# Patient Record
Sex: MALE | Race: WHITE | NOT HISPANIC OR LATINO
[De-identification: names, ages, dates, MRNs, and addresses within clinical notes are randomized per-mention and may not be internally consistent; named-entity substitution may affect disease eponyms.]

---

## 2017-04-07 ENCOUNTER — APPOINTMENT (OUTPATIENT)
Dept: CARDIOLOGY | Facility: CLINIC | Age: 72
End: 2017-04-07

## 2017-04-07 VITALS
BODY MASS INDEX: 39.03 KG/M2 | SYSTOLIC BLOOD PRESSURE: 100 MMHG | TEMPERATURE: 98.1 F | HEIGHT: 73 IN | DIASTOLIC BLOOD PRESSURE: 62 MMHG | OXYGEN SATURATION: 93 % | WEIGHT: 294.5 LBS | HEART RATE: 78 BPM

## 2017-04-07 DIAGNOSIS — I73.9 PERIPHERAL VASCULAR DISEASE, UNSPECIFIED: ICD-10-CM

## 2017-04-07 DIAGNOSIS — I47.2 VENTRICULAR TACHYCARDIA: ICD-10-CM

## 2017-08-21 ENCOUNTER — INPATIENT (INPATIENT)
Facility: HOSPITAL | Age: 72
LOS: 1 days | Discharge: HOME | DRG: 690 | End: 2017-08-23
Attending: INTERNAL MEDICINE | Admitting: INTERNAL MEDICINE
Payer: MEDICARE

## 2017-08-21 VITALS
TEMPERATURE: 99 F | SYSTOLIC BLOOD PRESSURE: 111 MMHG | HEART RATE: 64 BPM | DIASTOLIC BLOOD PRESSURE: 76 MMHG | OXYGEN SATURATION: 95 % | RESPIRATION RATE: 22 BRPM

## 2017-08-21 DIAGNOSIS — N39.0 URINARY TRACT INFECTION, SITE NOT SPECIFIED: ICD-10-CM

## 2017-08-21 DIAGNOSIS — I50.9 HEART FAILURE, UNSPECIFIED: ICD-10-CM

## 2017-08-21 DIAGNOSIS — Z98.890 OTHER SPECIFIED POSTPROCEDURAL STATES: Chronic | ICD-10-CM

## 2017-08-21 DIAGNOSIS — N17.9 ACUTE KIDNEY FAILURE, UNSPECIFIED: ICD-10-CM

## 2017-08-21 DIAGNOSIS — Z29.9 ENCOUNTER FOR PROPHYLACTIC MEASURES, UNSPECIFIED: ICD-10-CM

## 2017-08-21 DIAGNOSIS — R06.02 SHORTNESS OF BREATH: ICD-10-CM

## 2017-08-21 DIAGNOSIS — E11.9 TYPE 2 DIABETES MELLITUS WITHOUT COMPLICATIONS: ICD-10-CM

## 2017-08-21 LAB
ALBUMIN SERPL ELPH-MCNC: 4.1 G/DL — SIGNIFICANT CHANGE UP (ref 3.3–5)
ALP SERPL-CCNC: 78 U/L — SIGNIFICANT CHANGE UP (ref 40–120)
ALT FLD-CCNC: 47 U/L RC — HIGH (ref 10–45)
ANION GAP SERPL CALC-SCNC: 13 MMOL/L — SIGNIFICANT CHANGE UP (ref 5–17)
APPEARANCE UR: ABNORMAL
AST SERPL-CCNC: 36 U/L — SIGNIFICANT CHANGE UP (ref 10–40)
BACTERIA # UR AUTO: ABNORMAL /HPF
BASOPHILS # BLD AUTO: 0.1 K/UL — SIGNIFICANT CHANGE UP (ref 0–0.2)
BASOPHILS NFR BLD AUTO: 0.7 % — SIGNIFICANT CHANGE UP (ref 0–2)
BILIRUB SERPL-MCNC: 0.7 MG/DL — SIGNIFICANT CHANGE UP (ref 0.2–1.2)
BILIRUB UR-MCNC: NEGATIVE — SIGNIFICANT CHANGE UP
BUN SERPL-MCNC: 28 MG/DL — HIGH (ref 7–23)
CALCIUM SERPL-MCNC: 9.8 MG/DL — SIGNIFICANT CHANGE UP (ref 8.4–10.5)
CHLORIDE SERPL-SCNC: 101 MMOL/L — SIGNIFICANT CHANGE UP (ref 96–108)
CK MB BLD-MCNC: 1.3 % — SIGNIFICANT CHANGE UP (ref 0–3.5)
CK MB BLD-MCNC: 1.7 % — SIGNIFICANT CHANGE UP (ref 0–3.5)
CK MB CFR SERPL CALC: 2.2 NG/ML — SIGNIFICANT CHANGE UP (ref 0–6.7)
CK MB CFR SERPL CALC: 2.4 NG/ML — SIGNIFICANT CHANGE UP (ref 0–6.7)
CK SERPL-CCNC: 130 U/L — SIGNIFICANT CHANGE UP (ref 30–200)
CK SERPL-CCNC: 184 U/L — SIGNIFICANT CHANGE UP (ref 30–200)
CO2 SERPL-SCNC: 25 MMOL/L — SIGNIFICANT CHANGE UP (ref 22–31)
COLOR SPEC: YELLOW — SIGNIFICANT CHANGE UP
COMMENT - URINE: SIGNIFICANT CHANGE UP
CREAT SERPL-MCNC: 1.67 MG/DL — HIGH (ref 0.5–1.3)
DIFF PNL FLD: ABNORMAL
EOSINOPHIL # BLD AUTO: 0.1 K/UL — SIGNIFICANT CHANGE UP (ref 0–0.5)
EOSINOPHIL NFR BLD AUTO: 1.5 % — SIGNIFICANT CHANGE UP (ref 0–6)
EPI CELLS # UR: SIGNIFICANT CHANGE UP /HPF
GLUCOSE SERPL-MCNC: 124 MG/DL — HIGH (ref 70–99)
GLUCOSE UR QL: NEGATIVE — SIGNIFICANT CHANGE UP
HCT VFR BLD CALC: 39 % — SIGNIFICANT CHANGE UP (ref 39–50)
HGB BLD-MCNC: 12.7 G/DL — LOW (ref 13–17)
HYALINE CASTS # UR AUTO: ABNORMAL
KETONES UR-MCNC: NEGATIVE — SIGNIFICANT CHANGE UP
LEUKOCYTE ESTERASE UR-ACNC: ABNORMAL
LYMPHOCYTES # BLD AUTO: 2.4 K/UL — SIGNIFICANT CHANGE UP (ref 1–3.3)
LYMPHOCYTES # BLD AUTO: 25.6 % — SIGNIFICANT CHANGE UP (ref 13–44)
MCHC RBC-ENTMCNC: 30.8 PG — SIGNIFICANT CHANGE UP (ref 27–34)
MCHC RBC-ENTMCNC: 32.6 GM/DL — SIGNIFICANT CHANGE UP (ref 32–36)
MCV RBC AUTO: 94.4 FL — SIGNIFICANT CHANGE UP (ref 80–100)
MONOCYTES # BLD AUTO: 0.9 K/UL — SIGNIFICANT CHANGE UP (ref 0–0.9)
MONOCYTES NFR BLD AUTO: 10.1 % — SIGNIFICANT CHANGE UP (ref 2–14)
NEUTROPHILS # BLD AUTO: 5.7 K/UL — SIGNIFICANT CHANGE UP (ref 1.8–7.4)
NEUTROPHILS NFR BLD AUTO: 62.1 % — SIGNIFICANT CHANGE UP (ref 43–77)
NITRITE UR-MCNC: NEGATIVE — SIGNIFICANT CHANGE UP
NT-PROBNP SERPL-SCNC: 2927 PG/ML — HIGH (ref 0–300)
PH UR: 6.5 — SIGNIFICANT CHANGE UP (ref 5–8)
PLATELET # BLD AUTO: 201 K/UL — SIGNIFICANT CHANGE UP (ref 150–400)
POTASSIUM SERPL-MCNC: 4.9 MMOL/L — SIGNIFICANT CHANGE UP (ref 3.5–5.3)
POTASSIUM SERPL-SCNC: 4.9 MMOL/L — SIGNIFICANT CHANGE UP (ref 3.5–5.3)
PROT SERPL-MCNC: 7.2 G/DL — SIGNIFICANT CHANGE UP (ref 6–8.3)
PROT UR-MCNC: 100 MG/DL
RBC # BLD: 4.13 M/UL — LOW (ref 4.2–5.8)
RBC # FLD: 12.5 % — SIGNIFICANT CHANGE UP (ref 10.3–14.5)
RBC CASTS # UR COMP ASSIST: ABNORMAL /HPF (ref 0–2)
SODIUM SERPL-SCNC: 139 MMOL/L — SIGNIFICANT CHANGE UP (ref 135–145)
SP GR SPEC: 1.01 — SIGNIFICANT CHANGE UP (ref 1.01–1.02)
TROPONIN T SERPL-MCNC: <0.01 NG/ML — SIGNIFICANT CHANGE UP (ref 0–0.06)
TROPONIN T SERPL-MCNC: <0.01 NG/ML — SIGNIFICANT CHANGE UP (ref 0–0.06)
UROBILINOGEN FLD QL: NEGATIVE — SIGNIFICANT CHANGE UP
WBC # BLD: 9.2 K/UL — SIGNIFICANT CHANGE UP (ref 3.8–10.5)
WBC # FLD AUTO: 9.2 K/UL — SIGNIFICANT CHANGE UP (ref 3.8–10.5)
WBC UR QL: ABNORMAL /HPF (ref 0–5)

## 2017-08-21 PROCEDURE — 99285 EMERGENCY DEPT VISIT HI MDM: CPT | Mod: 25

## 2017-08-21 PROCEDURE — 99223 1ST HOSP IP/OBS HIGH 75: CPT

## 2017-08-21 PROCEDURE — 71010: CPT | Mod: 26

## 2017-08-21 PROCEDURE — 93010 ELECTROCARDIOGRAM REPORT: CPT

## 2017-08-21 RX ORDER — CARVEDILOL PHOSPHATE 80 MG/1
25 CAPSULE, EXTENDED RELEASE ORAL EVERY 12 HOURS
Qty: 0 | Refills: 0 | Status: DISCONTINUED | OUTPATIENT
Start: 2017-08-21 | End: 2017-08-23

## 2017-08-21 RX ORDER — DEXTROSE 50 % IN WATER 50 %
1 SYRINGE (ML) INTRAVENOUS ONCE
Qty: 0 | Refills: 0 | Status: DISCONTINUED | OUTPATIENT
Start: 2017-08-21 | End: 2017-08-23

## 2017-08-21 RX ORDER — INSULIN LISPRO 100/ML
VIAL (ML) SUBCUTANEOUS
Qty: 0 | Refills: 0 | Status: DISCONTINUED | OUTPATIENT
Start: 2017-08-21 | End: 2017-08-23

## 2017-08-21 RX ORDER — ATORVASTATIN CALCIUM 80 MG/1
20 TABLET, FILM COATED ORAL AT BEDTIME
Qty: 0 | Refills: 0 | Status: DISCONTINUED | OUTPATIENT
Start: 2017-08-21 | End: 2017-08-23

## 2017-08-21 RX ORDER — FINASTERIDE 5 MG/1
0 TABLET, FILM COATED ORAL
Qty: 0 | Refills: 0 | COMMUNITY

## 2017-08-21 RX ORDER — DEXTROSE 50 % IN WATER 50 %
25 SYRINGE (ML) INTRAVENOUS ONCE
Qty: 0 | Refills: 0 | Status: DISCONTINUED | OUTPATIENT
Start: 2017-08-21 | End: 2017-08-23

## 2017-08-21 RX ORDER — INSULIN LISPRO 100/ML
VIAL (ML) SUBCUTANEOUS AT BEDTIME
Qty: 0 | Refills: 0 | Status: DISCONTINUED | OUTPATIENT
Start: 2017-08-21 | End: 2017-08-23

## 2017-08-21 RX ORDER — CEFTRIAXONE 500 MG/1
1 INJECTION, POWDER, FOR SOLUTION INTRAMUSCULAR; INTRAVENOUS EVERY 24 HOURS
Qty: 0 | Refills: 0 | Status: DISCONTINUED | OUTPATIENT
Start: 2017-08-21 | End: 2017-08-23

## 2017-08-21 RX ORDER — FINASTERIDE 5 MG/1
5 TABLET, FILM COATED ORAL DAILY
Qty: 0 | Refills: 0 | Status: DISCONTINUED | OUTPATIENT
Start: 2017-08-21 | End: 2017-08-23

## 2017-08-21 RX ORDER — DEXTROSE 50 % IN WATER 50 %
12.5 SYRINGE (ML) INTRAVENOUS ONCE
Qty: 0 | Refills: 0 | Status: DISCONTINUED | OUTPATIENT
Start: 2017-08-21 | End: 2017-08-23

## 2017-08-21 RX ORDER — SODIUM CHLORIDE 9 MG/ML
1000 INJECTION, SOLUTION INTRAVENOUS
Qty: 0 | Refills: 0 | Status: DISCONTINUED | OUTPATIENT
Start: 2017-08-21 | End: 2017-08-23

## 2017-08-21 RX ORDER — HEPARIN SODIUM 5000 [USP'U]/ML
5000 INJECTION INTRAVENOUS; SUBCUTANEOUS EVERY 8 HOURS
Qty: 0 | Refills: 0 | Status: DISCONTINUED | OUTPATIENT
Start: 2017-08-21 | End: 2017-08-23

## 2017-08-21 RX ORDER — GLUCAGON INJECTION, SOLUTION 0.5 MG/.1ML
1 INJECTION, SOLUTION SUBCUTANEOUS ONCE
Qty: 0 | Refills: 0 | Status: DISCONTINUED | OUTPATIENT
Start: 2017-08-21 | End: 2017-08-23

## 2017-08-21 RX ORDER — CEFTRIAXONE 500 MG/1
1 INJECTION, POWDER, FOR SOLUTION INTRAMUSCULAR; INTRAVENOUS ONCE
Qty: 0 | Refills: 0 | Status: COMPLETED | OUTPATIENT
Start: 2017-08-21 | End: 2017-08-21

## 2017-08-21 RX ORDER — FUROSEMIDE 40 MG
0 TABLET ORAL
Qty: 0 | Refills: 0 | COMMUNITY

## 2017-08-21 RX ORDER — TRIAMCINOLONE 4 MG
0 TABLET ORAL
Qty: 0 | Refills: 0 | COMMUNITY

## 2017-08-21 RX ORDER — METFORMIN HYDROCHLORIDE 850 MG/1
0 TABLET ORAL
Qty: 0 | Refills: 0 | COMMUNITY

## 2017-08-21 RX ORDER — ASPIRIN/CALCIUM CARB/MAGNESIUM 324 MG
81 TABLET ORAL DAILY
Qty: 0 | Refills: 0 | Status: DISCONTINUED | OUTPATIENT
Start: 2017-08-21 | End: 2017-08-23

## 2017-08-21 RX ADMIN — ATORVASTATIN CALCIUM 20 MILLIGRAM(S): 80 TABLET, FILM COATED ORAL at 21:56

## 2017-08-21 RX ADMIN — CEFTRIAXONE 100 GRAM(S): 500 INJECTION, POWDER, FOR SOLUTION INTRAMUSCULAR; INTRAVENOUS at 18:53

## 2017-08-21 RX ADMIN — HEPARIN SODIUM 5000 UNIT(S): 5000 INJECTION INTRAVENOUS; SUBCUTANEOUS at 22:26

## 2017-08-21 NOTE — ED PROVIDER NOTE - NS ED ROS FT
General: no fever, no chills +hot flashes  Ophthalmologic: no change in vision  ENMT: no sore throat  Respiratory and Thorax: no SOB, no cough  Cardiovascular: no CP  Gastrointestinal: no abd pain, N/V/D  Genitourinary: +dysuria, +dribbling, +straining on urination  Musculoskeletal: no joint pain  Neurological: no HA  Psychiatric: no anxiety/depression  Hematology/Lymphatics: no abnormal bleeding  Endocrine: no changes in weight

## 2017-08-21 NOTE — ED PROVIDER NOTE - ATTENDING CONTRIBUTION TO CARE
Attending MD Sampson: I personally have seen and examined this patient.  Resident note reviewed and agree on plan of care and except where noted.  See below for details.     71M with PMH including CABG, HF, DM2, BPH Attending MD Sampson: I personally have seen and examined this patient.  Resident note reviewed and agree on plan of care and except where noted.  See below for details.     71M with PMH including CAD s/p CABG, MIx2, cath 6/2012, HF, DM2, neuropathy, nephrolithiasis, BPH presents to the ED with difficulty urinating.  Reports began on Saturday night when he "couldn't pee a little bit". Reports inability to urinate was accompanied by suprapubic pain and dysuria.  Reports also complaining of SOB on Saturday.  Reports that skipped his Lasix secondary to dysuria.  Reports has to strain to urinate and has dribbling.  Denies hematuria.  Reports has had kidney stones in past that necessitated removal by urology.  Reports also had hot flashes and was sweaty on Saturday. Denies fevers, chills, dizziness, weakness. Denies other abdominal pain, nausea, vomiting, diarrhea, blood in stools. Denies chest pain, palpitations, cough.  Reports occasional EtOH (once monthly), former tobacco, denies drugs.  On exam, NAD, head NCAT, PERRL, FROM at neck, no tenderness to palpation or stepoffs along length of spine, lungs with scattered crackles at bases with good inspiratory effort, +S1S2, no m/r/g, abdomen soft with +BS, +suprapubic tenderness, ND, no CVAT, moving all extremities with 5/5 strength bilateral upper and lower extremities; A/P: 71M with difficulty urinating, will obtain UA, UrCx, Bladder scan, labs, including BNP, enzymes, pain control, CXR, EKG, reassess

## 2017-08-21 NOTE — ED ADULT NURSE REASSESSMENT NOTE - NS ED NURSE REASSESS COMMENT FT1
Ballesteros catheter inserted using sterile technique, draining by gravity, secured with StatLock. Patient tolerated procedure well. Drained 200ml clear yellow urine

## 2017-08-21 NOTE — ED PROVIDER NOTE - CARE PLAN
Principal Discharge DX:	Urinary tract infection  Instructions for follow-up, activity and diet:	-UA positive  -ceftraixone for UTI  -admit to medicine

## 2017-08-21 NOTE — H&P ADULT - NSHPLABSRESULTS_GEN_ALL_CORE
Personally reviewed labs in detail below: notable for JHON    Personally reviewed CXR: no appreciable focal consolidations     Personally reviewed EKG SR 1st AV block, PVCs, LAD

## 2017-08-21 NOTE — H&P ADULT - ASSESSMENT
72 yo man with PMH of CABG, hx of CHF EF 25% in 2012, DMT2, BPH presenting with difficulty urinating since Saturday night with dysuria with UA suggestive of UTI and BMP notable for  JHON.

## 2017-08-21 NOTE — ED ADULT NURSE REASSESSMENT NOTE - NS ED NURSE REASSESS COMMENT FT1
Patient appears to be resting comfortably in stretcher; denies pain, n/v/d, dizziness, chest pain, SOB; a&ox3; safety and comfort measures provided

## 2017-08-21 NOTE — ED ADULT NURSE NOTE - OBJECTIVE STATEMENT
72 yo male A&OX3 presents to the ED with the c/o sob. Pt states that he has been having urinary retention for 3 days. States that he has a cardiac hx, on asa 81mg daily, denies any use of blood thinners. Pt states that he noticed that he gained 3 lbs in 1 week. States that he is sob, worsening when he ambulates. Lungs clear, equal, b/l, no cough, no wheezing. Pt appears comfortable, not in resp distress. Pt states that he is having small amounts of urine, no burning on urination, no urinary frequency, denies any fevers or chills. Abd round, soft, distended, no abd pain noted.

## 2017-08-21 NOTE — H&P ADULT - ATTENDING COMMENTS
Dr. Miki Cooper accepted patient's case from the ED and requested inhouse hospitalist team to complete admission. Patient previously unknown to me and I was assigned to case. Dr. Miki Cooper to assume care in AM. Sign out given to night NP Dr. Miki Cooper accepted patient's case from the ED and requested inhouse hospitalist team to complete admission. Patient previously unknown to me and I was assigned to case. Dr. Miki Cooper to assume care in AM. Sign out given to rosi NP, Rebecca, [id number]

## 2017-08-21 NOTE — ED PROVIDER NOTE - PHYSICAL EXAMINATION
Constitutional: no acute distress  Eyes: clear conjunctiva  ENMT: moist oral mucosa  Respiratory: CTA b/l  Cardiovascular: S1, S2, RR  Gastrointestinal: soft, NT, ND  Genitourinary: +suprapubic tenderness. No CVA tenderness.   Extremities: no LE edema  Neurological: AAO x 3  Skin: warm, dry

## 2017-08-21 NOTE — H&P ADULT - PROBLEM SELECTOR PLAN 2
Patient states that he has baseline SOB, CXR with no appreciable consolidations  Unclear if underlying pulm edema yet appears comparable to last CXR in 2012 (no pulm edema at that time). Concern for possible acute CHF with symptoms of orthopnea.   Monitor on tele  Trend CE  C/W Coreg, Crestor, Aspirin  Strict I/O Patient states that he has baseline SOB. Patient is former smoker, but denies formal diagnosis of asthma or COPD. Patient denies diagnosis of BENITO, OHS.  CXR with no appreciable consolidations to suggest PNA. Unclear if underlying pulm edema yet appears comparable to last CXR in 2012 (no pulm edema at that time). Concern for possible acute CHF with symptoms of orthopnea.   Monitor on tele  Trend CE  C/W Coreg, Crestor, Aspirin  Strict I/O  Patient may benefit from outpatient sleep study  BiPAP prn for symptom relief.  Consider pulm consult in AM

## 2017-08-21 NOTE — ED ADULT NURSE NOTE - CHPI ED SYMPTOMS NEG
no chest pain/no cough/no diaphoresis/no hemoptysis/no chills/no fever/no wheezing/no headache/no body aches/no edema

## 2017-08-21 NOTE — H&P ADULT - PROBLEM SELECTOR PLAN 1
UA suggestive of UTI  F/U Urine Culture  C/W Ceftriaxone pending Urine Cx Speciation and Senstivities

## 2017-08-21 NOTE — ED ADULT NURSE NOTE - PMH
Active smoker    CAD (coronary artery disease)    CHF (congestive heart failure)    H/O hemorrhoids    Hyperlipidemia    MI (myocardial infarction)

## 2017-08-21 NOTE — H&P ADULT - HISTORY OF PRESENT ILLNESS
70 yo man with PMH of CABG, hx of CHF EF 25% in 2012, DMT2, BPH presenting with difficulty urinating since Saturday night with dysuria. Patient states that he has had straining on urination and dribbling. Denies hematuria. Endorses sweats. Denies fevers and chills. Patient has been on Finasteride. Has tried Flomax in the distant past which he states didn't control his symptoms at that time. Patient also states that he has had shortness of breath that has worsened, states that it was very difficult for him to sleep on Saturday. Patient states he needed to sit more upright. Endoreses baseline ankle edema. He admits to skipping Lasix in the past 2 days 2/2 to difficulty urination. Denies CP, palpitations. Endorses baseline cough. 70 yo man with PMH of CABG, hx of CHF EF 25% in 2012, DMT2, BPH presenting with difficulty urinating since Saturday night with dysuria. Patient states that he has had straining on urination and dribbling. Denies hematuria. Endorses sweats. Denies fevers and chills. Denies abdominal pain, flank pain, nausea, emesis. Patient has been on Finasteride. Has tried Flomax in the distant past which he states didn't control his symptoms at that time. Patient also states that he has had shortness of breath that has worsened, states that it was very difficult for him to sleep on Saturday. Patient states he needed to sit more upright. Endores baseline ankle edema. He admits to skipping Lasix in the past 2 days 2/2 to difficulty urination. Denies CP, palpitations. Endorses baseline cough. 72 yo man with PMH of CABG, hx of CHF EF 25% in 2012, DMT2, BPH presenting with difficulty urinating since Saturday night with dysuria. Patient states that he has had straining on urination and dribbling. Denies hematuria. Endorses sweats. Denies fevers and chills. Denies abdominal pain, flank pain, nausea, emesis. Patient has been on Finasteride. Has tried Flomax in the distant past which he states didn't control his symptoms at that time. Denies history of UTI. Patient also states that he has had shortness of breath that has worsened, states that it was very difficult for him to sleep on Saturday. Patient states he needed to sit more upright. Endores baseline ankle edema. He admits to skipping Lasix in the past 2 days 2/2 to difficulty urination. Denies CP, palpitation, wheeze. Endorses baseline cough. Patient states that he used his wife's BiPAP/CPAP overnight and felt improvement of symptoms. He has not been formally diagnosed BENITO, OHS.

## 2017-08-21 NOTE — H&P ADULT - PROBLEM SELECTOR PLAN 3
Last Cr in 2012: notable for Cr 1.21. Currently Cr is 1.61  Primary day team to obtain collateral from PMD with regards to baseline  Check Urine lytes   Check Renal US   Ballesteros placed by ED team, monitor I/O Last Cr in 2012: notable for Cr 1.21. Currently Cr is 1.61  Primary day team to obtain collateral from PMD with regards to baseline  Ballesteros placed by ED team, monitor I/O  Check Urine lytes   Check Renal US   Continue with finasteride  Consider renal and urology consult in AM Last Cr in 2012: notable for Cr 1.21. Currently Cr is 1.61  Primary day team to obtain collateral from PMD with regards to baseline  Ballesteros placed by ED team, monitor I/O  Check Urine lytes   Check Renal US   Continue with finasteride  Hold ARB  Consider renal and urology consult in AM

## 2017-08-21 NOTE — H&P ADULT - RS GEN PE MLT RESP DETAILS PC
no wheezes/airway patent/clear to auscultation bilaterally/respirations non-labored/breath sounds equal/rales/no rhonchi/scant bibasilar rales

## 2017-08-21 NOTE — ED PROVIDER NOTE - OBJECTIVE STATEMENT
71/M with hx CABG, heart failure EF 25% in 2012, BPH, T2DM comes to [facility] ED 71/M with hx CABG, heart failure EF 25% in 2012, BPH, T2DM comes to [facility] ED c/o difficulty urinating that started Saturday night as well as suprapubic pain and dysuria. Patient also c/o SOB and admitted to skipping lasix x 2 days due to dysuria. Patient denies hematuria. He endorses straining on urination and dribbling. He has history of nephrolithiasis which was removed by urology in 2000. Patient denies fever/chills. He states he had "hot flashes" last Saturday.

## 2017-08-22 DIAGNOSIS — I25.10 ATHEROSCLEROTIC HEART DISEASE OF NATIVE CORONARY ARTERY WITHOUT ANGINA PECTORIS: ICD-10-CM

## 2017-08-22 DIAGNOSIS — E11.9 TYPE 2 DIABETES MELLITUS WITHOUT COMPLICATIONS: ICD-10-CM

## 2017-08-22 DIAGNOSIS — I50.22 CHRONIC SYSTOLIC (CONGESTIVE) HEART FAILURE: ICD-10-CM

## 2017-08-22 LAB
ANION GAP SERPL CALC-SCNC: 13 MMOL/L — SIGNIFICANT CHANGE UP (ref 5–17)
BASOPHILS # BLD AUTO: 0.1 K/UL — SIGNIFICANT CHANGE UP (ref 0–0.2)
BASOPHILS NFR BLD AUTO: 0.7 % — SIGNIFICANT CHANGE UP (ref 0–2)
BUN SERPL-MCNC: 27 MG/DL — HIGH (ref 7–23)
CALCIUM SERPL-MCNC: 9.4 MG/DL — SIGNIFICANT CHANGE UP (ref 8.4–10.5)
CHLORIDE SERPL-SCNC: 101 MMOL/L — SIGNIFICANT CHANGE UP (ref 96–108)
CK MB BLD-MCNC: 1.2 % — SIGNIFICANT CHANGE UP (ref 0–3.5)
CK MB CFR SERPL CALC: 1.8 NG/ML — SIGNIFICANT CHANGE UP (ref 0–6.7)
CK SERPL-CCNC: 154 U/L — SIGNIFICANT CHANGE UP (ref 30–200)
CO2 SERPL-SCNC: 24 MMOL/L — SIGNIFICANT CHANGE UP (ref 22–31)
CREAT ?TM UR-MCNC: 182 MG/DL — SIGNIFICANT CHANGE UP
CREAT SERPL-MCNC: 1.57 MG/DL — HIGH (ref 0.5–1.3)
EOSINOPHIL # BLD AUTO: 0.2 K/UL — SIGNIFICANT CHANGE UP (ref 0–0.5)
EOSINOPHIL NFR BLD AUTO: 1.4 % — SIGNIFICANT CHANGE UP (ref 0–6)
GLUCOSE SERPL-MCNC: 158 MG/DL — HIGH (ref 70–99)
HBA1C BLD-MCNC: 7.8 % — HIGH (ref 4–5.6)
HCT VFR BLD CALC: 37.1 % — LOW (ref 39–50)
HGB BLD-MCNC: 12.5 G/DL — LOW (ref 13–17)
LYMPHOCYTES # BLD AUTO: 1.8 K/UL — SIGNIFICANT CHANGE UP (ref 1–3.3)
LYMPHOCYTES # BLD AUTO: 17 % — SIGNIFICANT CHANGE UP (ref 13–44)
MAGNESIUM SERPL-MCNC: 2 MG/DL — SIGNIFICANT CHANGE UP (ref 1.6–2.6)
MCHC RBC-ENTMCNC: 32.4 PG — SIGNIFICANT CHANGE UP (ref 27–34)
MCHC RBC-ENTMCNC: 33.8 GM/DL — SIGNIFICANT CHANGE UP (ref 32–36)
MCV RBC AUTO: 95.8 FL — SIGNIFICANT CHANGE UP (ref 80–100)
MONOCYTES # BLD AUTO: 1 K/UL — HIGH (ref 0–0.9)
MONOCYTES NFR BLD AUTO: 9.4 % — SIGNIFICANT CHANGE UP (ref 2–14)
NEUTROPHILS # BLD AUTO: 7.6 K/UL — HIGH (ref 1.8–7.4)
NEUTROPHILS NFR BLD AUTO: 71.4 % — SIGNIFICANT CHANGE UP (ref 43–77)
PHOSPHATE SERPL-MCNC: 3.9 MG/DL — SIGNIFICANT CHANGE UP (ref 2.5–4.5)
PLATELET # BLD AUTO: 205 K/UL — SIGNIFICANT CHANGE UP (ref 150–400)
POTASSIUM SERPL-MCNC: 5 MMOL/L — SIGNIFICANT CHANGE UP (ref 3.5–5.3)
POTASSIUM SERPL-SCNC: 5 MMOL/L — SIGNIFICANT CHANGE UP (ref 3.5–5.3)
RBC # BLD: 3.87 M/UL — LOW (ref 4.2–5.8)
RBC # FLD: 12.5 % — SIGNIFICANT CHANGE UP (ref 10.3–14.5)
SODIUM SERPL-SCNC: 138 MMOL/L — SIGNIFICANT CHANGE UP (ref 135–145)
SODIUM UR-SCNC: 46 MMOL/L — SIGNIFICANT CHANGE UP
TROPONIN T SERPL-MCNC: <0.01 NG/ML — SIGNIFICANT CHANGE UP (ref 0–0.06)
UUN UR-MCNC: 1179 MG/DL — SIGNIFICANT CHANGE UP
WBC # BLD: 10.7 K/UL — HIGH (ref 3.8–10.5)
WBC # FLD AUTO: 10.7 K/UL — HIGH (ref 3.8–10.5)

## 2017-08-22 PROCEDURE — 76770 US EXAM ABDO BACK WALL COMP: CPT | Mod: 26

## 2017-08-22 PROCEDURE — 99223 1ST HOSP IP/OBS HIGH 75: CPT

## 2017-08-22 RX ORDER — HYDROCORTISONE 1 %
1 OINTMENT (GRAM) TOPICAL DAILY
Qty: 0 | Refills: 0 | Status: DISCONTINUED | OUTPATIENT
Start: 2017-08-22 | End: 2017-08-23

## 2017-08-22 RX ORDER — FUROSEMIDE 40 MG
40 TABLET ORAL DAILY
Qty: 0 | Refills: 0 | Status: DISCONTINUED | OUTPATIENT
Start: 2017-08-22 | End: 2017-08-23

## 2017-08-22 RX ORDER — TAMSULOSIN HYDROCHLORIDE 0.4 MG/1
0.4 CAPSULE ORAL AT BEDTIME
Qty: 0 | Refills: 0 | Status: DISCONTINUED | OUTPATIENT
Start: 2017-08-22 | End: 2017-08-23

## 2017-08-22 RX ADMIN — TAMSULOSIN HYDROCHLORIDE 0.4 MILLIGRAM(S): 0.4 CAPSULE ORAL at 22:25

## 2017-08-22 RX ADMIN — CEFTRIAXONE 100 GRAM(S): 500 INJECTION, POWDER, FOR SOLUTION INTRAMUSCULAR; INTRAVENOUS at 17:42

## 2017-08-22 RX ADMIN — ATORVASTATIN CALCIUM 20 MILLIGRAM(S): 80 TABLET, FILM COATED ORAL at 22:25

## 2017-08-22 RX ADMIN — CARVEDILOL PHOSPHATE 25 MILLIGRAM(S): 80 CAPSULE, EXTENDED RELEASE ORAL at 17:43

## 2017-08-22 RX ADMIN — Medication 81 MILLIGRAM(S): at 12:20

## 2017-08-22 RX ADMIN — HEPARIN SODIUM 5000 UNIT(S): 5000 INJECTION INTRAVENOUS; SUBCUTANEOUS at 05:11

## 2017-08-22 RX ADMIN — Medication 40 MILLIGRAM(S): at 16:43

## 2017-08-22 RX ADMIN — HEPARIN SODIUM 5000 UNIT(S): 5000 INJECTION INTRAVENOUS; SUBCUTANEOUS at 14:16

## 2017-08-22 RX ADMIN — FINASTERIDE 5 MILLIGRAM(S): 5 TABLET, FILM COATED ORAL at 12:20

## 2017-08-22 RX ADMIN — CARVEDILOL PHOSPHATE 25 MILLIGRAM(S): 80 CAPSULE, EXTENDED RELEASE ORAL at 05:11

## 2017-08-22 RX ADMIN — HEPARIN SODIUM 5000 UNIT(S): 5000 INJECTION INTRAVENOUS; SUBCUTANEOUS at 22:25

## 2017-08-22 NOTE — PROGRESS NOTE ADULT - ASSESSMENT
PT WITH CAD, CHRONIC  SYSTOLIC  CHF, CARDIOMYOPATHY,  DN, OBESITY, BPH, UNABLE  TO  VOID,  BHATIA PLACED    NEEDS IV  LASIX TODAY    UNSURE OF  BASELINE  CREATININE    UROLOGY CALLED, RENAL  U/S,  DVT  PPX,  FOLLOW  URINE  C/S, ON ROCEPHIN  START  FLOMAX  ON ASA/  LIPITOR/ COREG   CARD DR PETE BLANC  CALLED

## 2017-08-22 NOTE — CONSULT NOTE ADULT - ASSESSMENT
70 yo man with LV dysfunction and CAD, admitted with SOB as part of severe urinary retention. Symptoms of SOB resolved with resolution of non-urinating. No CP. No ECG acute changes. No positive cardiac enzymes.

## 2017-08-22 NOTE — CONSULT NOTE ADULT - SUBJECTIVE AND OBJECTIVE BOX
70 yo man with PMH of CABG, hx of CHF EF 25% in 2012, DMT2, BPH admitted for SOB and CHF exacerbation presenting with difficulty urinating since Saturday night with dysuria. Patient states that he has had straining on urination and dribbling. Denies hematuria. fevers and chills, abdominal pain, flank pain, nausea, emesis. Patient has been on Finasteride for a few years, rx'd by his PMD. Has tried Flomax in the distant past which he states he didnt like how he felt on it, but does not recall any serious reaction.  Denies history of recurrent UTI. His wife states he had a hip xray not too long ago which also showed a bladder stone.  He does not have a urologist    PAST MEDICAL & SURGICAL HISTORY:  H/O hemorrhoids  CHF (congestive heart failure)  Hyperlipidemia  MI (myocardial infarction)  CAD (coronary artery disease)  Active smoker  History of open heart surgery  Hx of tonsillectomy  Renal stone: surgically removed    MEDICATIONS  (STANDING):  finasteride 5 milliGRAM(s) Oral daily  aspirin enteric coated 81 milliGRAM(s) Oral daily  atorvastatin 20 milliGRAM(s) Oral at bedtime  carvedilol 25 milliGRAM(s) Oral every 12 hours  insulin lispro (HumaLOG) corrective regimen sliding scale   SubCutaneous three times a day before meals  insulin lispro (HumaLOG) corrective regimen sliding scale   SubCutaneous at bedtime  dextrose 5%. 1000 milliLiter(s) (50 mL/Hr) IV Continuous <Continuous>  dextrose 50% Injectable 12.5 Gram(s) IV Push once  dextrose 50% Injectable 25 Gram(s) IV Push once  dextrose 50% Injectable 25 Gram(s) IV Push once  cefTRIAXone   IVPB 1 Gram(s) IV Intermittent every 24 hours  heparin  Injectable 5000 Unit(s) SubCutaneous every 8 hours  tamsulosin 0.4 milliGRAM(s) Oral at bedtime  furosemide   Injectable 40 milliGRAM(s) IV Push daily  hydrocortisone hemorrhoidal Suppository 1 Suppository(s) Rectal daily    MEDICATIONS  (PRN):  dextrose Gel 1 Dose(s) Oral once PRN Blood Glucose LESS THAN 70 milliGRAM(s)/deciliter  glucagon  Injectable 1 milliGRAM(s) IntraMuscular once PRN Glucose LESS THAN 70 milligrams/deciliter  aluminum hydroxide/magnesium hydroxide/simethicone Suspension 30 milliLiter(s) Oral every 6 hours PRN Dyspepsia    FAMILY HISTORY:  No pertinent family history in first degree relatives    Allergies    No Known Allergies      SOCIAL HISTORY:   Tobacco hx: former smoker    REVIEW OF SYSTEMS: Pertinent positives and negatives as stated in HPI, otherwise negative    Vital signs  T(C): 36.8 (17 @ 04:59), Max: 37.4 (17 @ 19:38)  HR: 64 (17 @ 04:59)  BP: 126/60 (17 @ 04:59)  SpO2: 95% (17 @ 04:59)  Wt(kg): --    Output  I&O's Summary    21 Aug 2017 07:  -  22 Aug 2017 07:00  --------------------------------------------------------  IN: 440 mL / OUT: 750 mL / NET: -310 mL    22 Aug 2017 07:  -  22 Aug 2017 15:24  --------------------------------------------------------  IN: 540 mL / OUT: 0 mL / NET: 540 mL      UOP    Physical Exam  Gen: NAD  Pulm: No respiratory distress, no subcostal retractions  CV: RRR, no JVD  Abd: Soft, NT, ND  :  No discharge or blood at urethral meatus.  Testes descended bilaterally.  Testes and epididymis nontender bilaterally.  Cremasteric reflex present bilaterally. Ballesteros in place: urine clear yellow, prostate smooth nontender 3+  MSK: No edema present    LABS:           @ 05:29    WBC 10.7  / Hct 37.1  / SCr 1.57      @ 15:15    WBC 9.2   / Hct 39.0  / SCr 1.67         138  |  101  |  27<H>  ----------------------------<  158<H>  5.0   |  24  |  1.57<H>    Ca    9.4      22 Aug 2017 05:29  Phos  3.9     -  Mg     2.0         TPro  7.2  /  Alb  4.1  /  TBili  0.7  /  DBili  x   /  AST  36  /  ALT  47<H>  /  AlkPhos  78  -      Urinalysis Basic - ( 21 Aug 2017 15:15 )    Color: Yellow / Appearance: x / S.010 / pH: x  Gluc: x / Ketone: Negative  / Bili: Negative / Urobili: Negative   Blood: x / Protein: 100 mg/dL / Nitrite: Negative   Leuk Esterase: Moderate / RBC: 5-10 /HPF / WBC 10-25 /HPF   Sq Epi: x / Non Sq Epi: x / Bacteria: Few /HPF

## 2017-08-22 NOTE — CONSULT NOTE ADULT - ASSESSMENT
BPH, LUTS  - start Flomax  - continue finasteride  - please obtain urine culture, patient has symptoms and a +UA  - f/u renal bladder sono  - consider TOV at discharge BPH, LUTS  - start Flomax  - continue finasteride  - please obtain urine culture, patient has symptoms and a +UA  - f/u renal bladder sono  - keep garay for 1 wk, ToV in office  - outpt workup for BPH and possible bladder stone  - d/w Dr. Ac

## 2017-08-22 NOTE — CONSULT NOTE ADULT - SUBJECTIVE AND OBJECTIVE BOX
Patient seen and evaluated @ 1845.  Chief Complaint: Patient is a 71y old  Male who presents with a chief complaint of difficulty urinating (21 Aug 2017 20:42)      HPI:  70 yo man with PMH of CABG, hx of CHF EF 25% in , DMT2, BPH presenting with difficulty urinating since Saturday night with dysuria. Patient states that he has had straining on urination and dribbling. Denies hematuria. Endorses sweats. Denies fevers and chills. Denies abdominal pain, flank pain, nausea, emesis. Patient has been on Finasteride. Has tried Flomax in the distant past which he states didn't control his symptoms at that time. Denies history of UTI. Patient also states that he has had shortness of breath that has worsened, states that it was very difficult for him to sleep on Saturday. Patient states he needed to sit more upright. Endores baseline ankle edema. He admits to skipping Lasix in the past 2 days 2/2 to difficulty urination. Denies CP, palpitation, wheeze. Endorses baseline cough. Patient states that he used his wife's BiPAP/CPAP overnight and felt improvement of symptoms. He has not been formally diagnosed BENITO, OHS. (21 Aug 2017 20:42)    Cardiac history: I have known patient since . Cardiomyopathy with CAD, treated with mammary to his LAD. RCA and circumflex so small and occluded, no bypass could be done.  Nonsustained VT, but not inducible at EP study. Has refused AICD since then. Did not do well on digoxin, and has refused Entresto so far.  Most recently seen in April of this year. Most recent echo in May of 2016 with LVEF 28%, mild to moderate MR. Normal RV size and function with RVSP 33 mm mercury.    PMH:   H/O hemorrhoids  CHF (congestive heart failure)  Hyperlipidemia  MI (myocardial infarction)  CAD (coronary artery disease)  Active smoker    PSH:   History of open heart surgery  Hx of tonsillectomy  Renal stone    Medications:   finasteride 5 milliGRAM(s) Oral daily  aspirin enteric coated 81 milliGRAM(s) Oral daily  atorvastatin 20 milliGRAM(s) Oral at bedtime  carvedilol 25 milliGRAM(s) Oral every 12 hours  insulin lispro (HumaLOG) corrective regimen sliding scale   SubCutaneous three times a day before meals  insulin lispro (HumaLOG) corrective regimen sliding scale   SubCutaneous at bedtime  dextrose 5%. 1000 milliLiter(s) IV Continuous <Continuous>  dextrose Gel 1 Dose(s) Oral once PRN  dextrose 50% Injectable 12.5 Gram(s) IV Push once  dextrose 50% Injectable 25 Gram(s) IV Push once  dextrose 50% Injectable 25 Gram(s) IV Push once  glucagon  Injectable 1 milliGRAM(s) IntraMuscular once PRN  cefTRIAXone   IVPB 1 Gram(s) IV Intermittent every 24 hours  heparin  Injectable 5000 Unit(s) SubCutaneous every 8 hours  tamsulosin 0.4 milliGRAM(s) Oral at bedtime  furosemide   Injectable 40 milliGRAM(s) IV Push daily  hydrocortisone hemorrhoidal Suppository 1 Suppository(s) Rectal daily  aluminum hydroxide/magnesium hydroxide/simethicone Suspension 30 milliLiter(s) Oral every 6 hours PRN    Allergies:  No Known Allergies    FAMILY HISTORY:  No pertinent family history in first degree relatives    Social History:  Smoking: Current  Alcohol: None now.  Drugs:    Review of Systems:  Constitutional: [ -] Fever [- ] Chills [ ] Fatigue [ ] Weight change   HEENT: [ ] Blurred vision [ ] Eye Pain [ ] Headache [ ] Runny nose [ ] Sore Throat   Respiratory: [ ] Cough [ ] Wheezing [- ] Shortness of breath  Cardiovascular: [ -] Chest Pain [ -] Palpitations [ ] ENRIQUEZ [ ] PND [ ] Orthopnea  Gastrointestinal: [ -] Abdominal Pain [ ] Diarrhea [ ] Constipation [ ] Hemorrhoids [ ] Nausea [ ] Vomiting  Genitourinary: [+ ] Nocturia [- ] Dysuria [ -] Incontinence  Extremities: [ -] Swelling [ ] Joint Pain  Neurologic: [- ] Focal deficit [ ] Paresthesias [ ] Syncope  Lymphatic: [ ] Swelling [ ] Lymphadenopathy   Skin: [ ] Rash [ ] Ecchymoses [ ] Wounds [ ] Lesions  Psychiatry: [ ] Depression [ ] Suicidal/Homicidal Ideation [ ] Anxiety [ ] Sleep Disturbances  [ -] 10 point review of systems is otherwise negative except as mentioned above            [ ]Unable to obtain    Physical Exam:  T(C): 36.8 (17 @ 04:59), Max: 37.4 (17 @ 19:38)  HR: 77 (17 @ 17:36) (64 - 94)  BP: 129/78 (17 @ 17:36) (115/59 - 131/74)  RR: 18 (17 @ 04:59) (18 - 19)  SpO2: 95% (17 @ 04:59) (93% - 98%)  Wt(kg): --     @ 07:01  -   @ 07:00  --------------------------------------------------------  IN: 440 mL / OUT: 750 mL / NET: -310 mL     @ 07:01  -   @ 18:31  --------------------------------------------------------  IN: 590 mL / OUT: 0 mL / NET: 590 mL      Daily Height in cm: 190.5 (21 Aug 2017 21:15)    Daily Weight in k.8 (22 Aug 2017 03:04)    Appearance: WD, WN, NAD  Eyes: [ ] PERRL [ ] EOMI  HENT: [ +] Normal oral mucosa [+ ]NC/AT  Neck: No JVD at 90 degrees. Normal carotid upstrokes without bruits or murmurs.  Cardiovascular: Normal PMI. Normal S1, S2 physiologically split. No S3 or S4. No murmurs.  Respiratory: [+ ] Clear to auscultation bilaterally  Gastrointestinal: Soft.  Non-tender. No hepatosplenomegally.  Extremities: No edema. Pulses 2+ bilaterally symmetric including pedal.Musculoskeletal: [+ ] No clubbing [ ] No joint deformity   Neurologic: [+ ] Non-focal  Lymphatic: [ ] No lymphadenopathy  Psychiatry: [+ ] AAOx3 [ +] Mood & affect appropriate  Skin: [ ] No rashes [ ] No ecchymoses [ +] No cyanosis    Cardiovascular Diagnostic Testing:  ECG:< from: 12 Lead ECG (17 @ 15:37) >  Ventricular Rate 71 BPM    Atrial Rate 71 BPM    P-R Interval 220 ms    QRS Duration 122 ms     ms    QTc 458 ms    P Axis 50 degrees    R Axis -30 degrees    T Axis 126 degrees    Diagnosis Line SINUS RHYTHM WITH 1ST DEGREE A-V BLOCK WITH PREMATURE SUPRAVENTRICULAR COMPLEXES  LEFT AXIS DEVIATION  LEFT BUNDLE BRANCH BLOCK  ABNORMAL ECG    < end of copied text >      Echo: 2016 as mentioned in HPI    Stress Testing:    Cath:    Interpretation of Telemetry:    Imaging:< from: Xray Chest 1 View AP/PA (17 @ 16:37) >  XAM:  CHEST SINGLE AP OR PA                            PROCEDURE DATE:  2017            INTERPRETATION:    CLINICAL INDICATION: Shortness of breath    TECHNIQUE: Portable frontal view of the chest.    COMPARISON: Frontal chest radiograph from 2012.    FINDINGS:   There are sternotomy wires, status post CABG.  Cardiac size is enlarged.  The lungs are clear.  No pleural effusions or pneumothorax.  No acute osseous abnormality.      IMPRESSION:   The lungs are clear.    < end of copied text >      Labs:                        12.5   10.7  )-----------( 205      ( 22 Aug 2017 05:29 )             37.1         138  |  101  |  27<H>  ----------------------------<  158<H>  5.0   |  24  |  1.57<H>    Ca    9.4      22 Aug 2017 05:29  Phos  3.9       Mg     2.0         TPro  7.2  /  Alb  4.1  /  TBili  0.7  /  DBili  x   /  AST  36  /  ALT  47<H>  /  AlkPhos  78        CARDIAC MARKERS ( 22 Aug 2017 05:29 )  x     / <0.01 ng/mL / 154 U/L / x     / 1.8 ng/mL  CARDIAC MARKERS ( 21 Aug 2017 22:06 )  x     / <0.01 ng/mL / 184 U/L / x     / 2.4 ng/mL  CARDIAC MARKERS ( 21 Aug 2017 15:15 )  x     / <0.01 ng/mL / 130 U/L / x     / 2.2 ng/mL      Serum Pro-Brain Natriuretic Peptide: 2927 pg/mL ( @ 15:15)      Hemoglobin A1C, Whole Blood: 7.8 % ( @ 07:25) Patient seen and evaluated @ 1845.  Chief Complaint: Patient is a 71y old  Male who presents with a chief complaint of difficulty urinating (21 Aug 2017 20:42)      HPI:  72 yo man with PMH of CABG, hx of CHF EF 25% in , DMT2, BPH presenting with difficulty urinating since Saturday night with dysuria. Patient states that he has had straining on urination and dribbling. Denies hematuria. Endorses sweats. Denies fevers and chills. Denies abdominal pain, flank pain, nausea, emesis. Patient has been on Finasteride. Has tried Flomax in the distant past which he states didn't control his symptoms at that time. Denies history of UTI. Patient also states that he has had shortness of breath that has worsened, states that it was very difficult for him to sleep on Saturday. Patient states he needed to sit more upright. Endores baseline ankle edema. He admits to skipping Lasix in the past 2 days 2/2 to difficulty urination. Denies CP, palpitation, wheeze. Endorses baseline cough. Patient states that he used his wife's BiPAP/CPAP overnight and felt improvement of symptoms. He has not been formally diagnosed BENITO, OHS. (21 Aug 2017 20:42)    Cardiac history: I have known patient since . Cardiomyopathy with CAD, treated with mammary to his LAD. RCA and circumflex so small and occluded, no bypass could be done.  Nonsustained VT, but not inducible at EP study. Has refused AICD since then. Did not do well on digoxin, and has refused Entresto so far.  Most recently seen in April of this year. Most recent echo in May of 2016 with LVEF 28%, mild to moderate MR. Normal RV size and function with RVSP 33 mm mercury.    PMH:   H/O hemorrhoids  CHF (congestive heart failure)  Hyperlipidemia  MI (myocardial infarction)  CAD (coronary artery disease)  Former smoker    PSH:   History of open heart surgery  Hx of tonsillectomy  Renal stone    Medications:   finasteride 5 milliGRAM(s) Oral daily  aspirin enteric coated 81 milliGRAM(s) Oral daily  atorvastatin 20 milliGRAM(s) Oral at bedtime  carvedilol 25 milliGRAM(s) Oral every 12 hours  insulin lispro (HumaLOG) corrective regimen sliding scale   SubCutaneous three times a day before meals  insulin lispro (HumaLOG) corrective regimen sliding scale   SubCutaneous at bedtime  dextrose 5%. 1000 milliLiter(s) IV Continuous <Continuous>  dextrose Gel 1 Dose(s) Oral once PRN  dextrose 50% Injectable 12.5 Gram(s) IV Push once  dextrose 50% Injectable 25 Gram(s) IV Push once  dextrose 50% Injectable 25 Gram(s) IV Push once  glucagon  Injectable 1 milliGRAM(s) IntraMuscular once PRN  cefTRIAXone   IVPB 1 Gram(s) IV Intermittent every 24 hours  heparin  Injectable 5000 Unit(s) SubCutaneous every 8 hours  tamsulosin 0.4 milliGRAM(s) Oral at bedtime  furosemide   Injectable 40 milliGRAM(s) IV Push daily  hydrocortisone hemorrhoidal Suppository 1 Suppository(s) Rectal daily  aluminum hydroxide/magnesium hydroxide/simethicone Suspension 30 milliLiter(s) Oral every 6 hours PRN    Allergies:  No Known Allergies    FAMILY HISTORY:  No pertinent family history in first degree relatives    Social History:  Smoking: Former, D/C .  Alcohol: None now.  Drugs:    Review of Systems:  Constitutional: [ -] Fever [- ] Chills [ ] Fatigue [ ] Weight change   HEENT: [ ] Blurred vision [ ] Eye Pain [ ] Headache [ ] Runny nose [ ] Sore Throat   Respiratory: [ ] Cough [ ] Wheezing [- ] Shortness of breath  Cardiovascular: [ -] Chest Pain [ -] Palpitations [ ] ENRIQUEZ [ ] PND [ ] Orthopnea  Gastrointestinal: [ -] Abdominal Pain [ ] Diarrhea [ ] Constipation [ ] Hemorrhoids [ ] Nausea [ ] Vomiting  Genitourinary: [+ ] Nocturia [- ] Dysuria [ -] Incontinence  Extremities: [ -] Swelling [ ] Joint Pain  Neurologic: [- ] Focal deficit [ ] Paresthesias [ ] Syncope  Lymphatic: [ ] Swelling [ ] Lymphadenopathy   Skin: [ ] Rash [ ] Ecchymoses [ ] Wounds [ ] Lesions  Psychiatry: [ ] Depression [ ] Suicidal/Homicidal Ideation [ ] Anxiety [ ] Sleep Disturbances  [ -] 10 point review of systems is otherwise negative except as mentioned above            [ ]Unable to obtain    Physical Exam:  T(C): 36.8 (17 @ 04:59), Max: 37.4 (17 @ 19:38)  HR: 77 (17 @ 17:36) (64 - 94)  BP: 129/78 (17 @ 17:36) (115/59 - 131/74)  RR: 18 (17 @ 04:59) (18 - 19)  SpO2: 95% (17 @ 04:59) (93% - 98%)  Wt(kg): --     @ 07:01  -   @ 07:00  --------------------------------------------------------  IN: 440 mL / OUT: 750 mL / NET: -310 mL     @ 07:01  -   @ 18:31  --------------------------------------------------------  IN: 590 mL / OUT: 0 mL / NET: 590 mL      Daily Height in cm: 190.5 (21 Aug 2017 21:15)    Daily Weight in k.8 (22 Aug 2017 03:04)    Appearance: WD, WN, NAD, obese  Eyes: [ ] PERRL [ ] EOMI  HENT: [ +] Normal oral mucosa [+ ]NC/AT  Neck: No JVD at 90 degrees. Normal carotid upstrokes without bruits or murmurs.  Cardiovascular: Normal PMI. Normal S1, S2 physiologically split. No S3 or S4. II/VI apical systolic murmur.  Respiratory: [+ ] Clear to auscultation bilaterally  Gastrointestinal: Soft.  Non-tender. No hepatosplenomegally.  Extremities: No edema. Pulses 2+ bilaterally symmetric except diminished pedal.  Musculoskeletal: [+ ] No clubbing [ ] No joint deformity   Neurologic: [+ ] Non-focal  Lymphatic: [ ] No lymphadenopathy  Psychiatry: [+ ] AAOx3 [ +] Mood & affect appropriate  Skin: [ ] No rashes [ ] No ecchymoses [ +] No cyanosis    Cardiovascular Diagnostic Testing:  ECG:< from: 12 Lead ECG (17 @ 15:37) >  Ventricular Rate 71 BPM    Atrial Rate 71 BPM    P-R Interval 220 ms    QRS Duration 122 ms     ms    QTc 458 ms    P Axis 50 degrees    R Axis -30 degrees    T Axis 126 degrees    Diagnosis Line SINUS RHYTHM WITH 1ST DEGREE A-V BLOCK WITH PREMATURE SUPRAVENTRICULAR COMPLEXES  LEFT AXIS DEVIATION  LEFT BUNDLE BRANCH BLOCK  ABNORMAL ECG    < end of copied text >      Echo: 2016 as mentioned in HPI    Stress Testing:    Cath:    Interpretation of Telemetry: NSR, VPC's occassional couplets, occasional bigeminy.    Imaging:< from: Xray Chest 1 View AP/PA (17 @ 16:37) >  XAM:  CHEST SINGLE AP OR PA                            PROCEDURE DATE:  2017            INTERPRETATION:    CLINICAL INDICATION: Shortness of breath    TECHNIQUE: Portable frontal view of the chest.    COMPARISON: Frontal chest radiograph from 2012.    FINDINGS:   There are sternotomy wires, status post CABG.  Cardiac size is enlarged.  The lungs are clear.  No pleural effusions or pneumothorax.  No acute osseous abnormality.      IMPRESSION:   The lungs are clear.    < end of copied text >      Labs:                        12.5   10.7  )-----------( 205      ( 22 Aug 2017 05:29 )             37.1         138  |  101  |  27<H>  ----------------------------<  158<H>  5.0   |  24  |  1.57<H>    Ca    9.4      22 Aug 2017 05:29  Phos  3.9       Mg     2.0         TPro  7.2  /  Alb  4.1  /  TBili  0.7  /  DBili  x   /  AST  36  /  ALT  47<H>  /  AlkPhos  78        CARDIAC MARKERS ( 22 Aug 2017 05:29 )  x     / <0.01 ng/mL / 154 U/L / x     / 1.8 ng/mL  CARDIAC MARKERS ( 21 Aug 2017 22:06 )  x     / <0.01 ng/mL / 184 U/L / x     / 2.4 ng/mL  CARDIAC MARKERS ( 21 Aug 2017 15:15 )  x     / <0.01 ng/mL / 130 U/L / x     / 2.2 ng/mL      Serum Pro-Brain Natriuretic Peptide: 2927 pg/mL ( @ 15:15)      Hemoglobin A1C, Whole Blood: 7.8 % ( @ 07:25)

## 2017-08-22 NOTE — PROGRESS NOTE ADULT - SUBJECTIVE AND OBJECTIVE BOX
SUBJECTIVE / OVERNIGHT EVENTS: No nausea, vomiting or diarrhea, no fever or chills, no dizziness or chest pain, no dysuria or hematuria .      CAPILLARY BLOOD GLUCOSE  146 (22 Aug 2017 08:30)  173 (21 Aug 2017 20:50)        I&O's Summary    21 Aug 2017 07:01  -  22 Aug 2017 07:00  --------------------------------------------------------  IN: 440 mL / OUT: 750 mL / NET: -310 mL        PHYSICAL EXAM:  HEAD:  Atraumatic, Normocephalic  EYES: EOMI, PERRLA, conjunctiva and sclera clear  NECK: Supple, No JVD  CHEST/LUNG: Clear to auscultation bilaterally; No wheeze  HEART: Regular rate and rhythm; No murmurs, rubs, or gallops  ABDOMEN: Soft, Nontender, Nondistended; Bowel sounds present  EXTREMITIES:  2+ Peripheral Pulses, No clubbing, cyanosis,   trace  edema  PSYCH: AAOx3  NEUROLOGY: non-focal,  garay  SKIN: No rashes or lesions    MEDICATIONS  (STANDING):  finasteride 5 milliGRAM(s) Oral daily  aspirin enteric coated 81 milliGRAM(s) Oral daily  atorvastatin 20 milliGRAM(s) Oral at bedtime  carvedilol 25 milliGRAM(s) Oral every 12 hours  insulin lispro (HumaLOG) corrective regimen sliding scale   SubCutaneous three times a day before meals  insulin lispro (HumaLOG) corrective regimen sliding scale   SubCutaneous at bedtime  dextrose 5%. 1000 milliLiter(s) (50 mL/Hr) IV Continuous <Continuous>  dextrose 50% Injectable 12.5 Gram(s) IV Push once  dextrose 50% Injectable 25 Gram(s) IV Push once  dextrose 50% Injectable 25 Gram(s) IV Push once  cefTRIAXone   IVPB 1 Gram(s) IV Intermittent every 24 hours  heparin  Injectable 5000 Unit(s) SubCutaneous every 8 hours    MEDICATIONS  (PRN):  dextrose Gel 1 Dose(s) Oral once PRN Blood Glucose LESS THAN 70 milliGRAM(s)/deciliter  glucagon  Injectable 1 milliGRAM(s) IntraMuscular once PRN Glucose LESS THAN 70 milligrams/deciliter      LABS:                        12.5   10.7  )-----------( 205      ( 22 Aug 2017 05:29 )             37.1     08-    138  |  101  |  27<H>  ----------------------------<  158<H>  5.0   |  24  |  1.57<H>    Ca    9.4      22 Aug 2017 05:29  Phos  3.9     -  Mg     2.0         TPro  7.2  /  Alb  4.1  /  TBili  0.7  /  DBili  x   /  AST  36  /  ALT  47<H>  /  AlkPhos  78  08-21      CARDIAC MARKERS ( 22 Aug 2017 05:29 )  x     / <0.01 ng/mL / 154 U/L / x     / 1.8 ng/mL  CARDIAC MARKERS ( 21 Aug 2017 22:06 )  x     / <0.01 ng/mL / 184 U/L / x     / 2.4 ng/mL  CARDIAC MARKERS ( 21 Aug 2017 15:15 )  x     / <0.01 ng/mL / 130 U/L / x     / 2.2 ng/mL      Urinalysis Basic - ( 21 Aug 2017 15:15 )    Color: Yellow / Appearance: x / S.010 / pH: x  Gluc: x / Ketone: Negative  / Bili: Negative / Urobili: Negative   Blood: x / Protein: 100 mg/dL / Nitrite: Negative   Leuk Esterase: Moderate / RBC: 5-10 /HPF / WBC 10-25 /HPF   Sq Epi: x / Non Sq Epi: x / Bacteria: Few /HPF            Hemoglobin A1C, Whole Blood: 7.8 % ( @ 07:25)        Cultures:    EKG:    Radiological Studies:    Consultant(s) Notes Reviewed:      Care Discussed with Consultants/Other Providers:

## 2017-08-23 ENCOUNTER — TRANSCRIPTION ENCOUNTER (OUTPATIENT)
Age: 72
End: 2017-08-23

## 2017-08-23 VITALS
RESPIRATION RATE: 16 BRPM | OXYGEN SATURATION: 94 % | SYSTOLIC BLOOD PRESSURE: 110 MMHG | DIASTOLIC BLOOD PRESSURE: 73 MMHG | HEART RATE: 61 BPM

## 2017-08-23 LAB
ANION GAP SERPL CALC-SCNC: 16 MMOL/L — SIGNIFICANT CHANGE UP (ref 5–17)
BUN SERPL-MCNC: 32 MG/DL — HIGH (ref 7–23)
CALCIUM SERPL-MCNC: 9.1 MG/DL — SIGNIFICANT CHANGE UP (ref 8.4–10.5)
CHLORIDE SERPL-SCNC: 101 MMOL/L — SIGNIFICANT CHANGE UP (ref 96–108)
CO2 SERPL-SCNC: 24 MMOL/L — SIGNIFICANT CHANGE UP (ref 22–31)
CREAT SERPL-MCNC: 1.54 MG/DL — HIGH (ref 0.5–1.3)
GLUCOSE SERPL-MCNC: 136 MG/DL — HIGH (ref 70–99)
HCT VFR BLD CALC: 36.1 % — LOW (ref 39–50)
HGB BLD-MCNC: 11.8 G/DL — LOW (ref 13–17)
MAGNESIUM SERPL-MCNC: 2.1 MG/DL — SIGNIFICANT CHANGE UP (ref 1.6–2.6)
MCHC RBC-ENTMCNC: 31.4 PG — SIGNIFICANT CHANGE UP (ref 27–34)
MCHC RBC-ENTMCNC: 32.8 GM/DL — SIGNIFICANT CHANGE UP (ref 32–36)
MCV RBC AUTO: 95.8 FL — SIGNIFICANT CHANGE UP (ref 80–100)
PLATELET # BLD AUTO: 208 K/UL — SIGNIFICANT CHANGE UP (ref 150–400)
POTASSIUM SERPL-MCNC: 4.4 MMOL/L — SIGNIFICANT CHANGE UP (ref 3.5–5.3)
POTASSIUM SERPL-SCNC: 4.4 MMOL/L — SIGNIFICANT CHANGE UP (ref 3.5–5.3)
RBC # BLD: 3.77 M/UL — LOW (ref 4.2–5.8)
RBC # FLD: 12.5 % — SIGNIFICANT CHANGE UP (ref 10.3–14.5)
SODIUM SERPL-SCNC: 141 MMOL/L — SIGNIFICANT CHANGE UP (ref 135–145)
WBC # BLD: 10.3 K/UL — SIGNIFICANT CHANGE UP (ref 3.8–10.5)
WBC # FLD AUTO: 10.3 K/UL — SIGNIFICANT CHANGE UP (ref 3.8–10.5)

## 2017-08-23 PROCEDURE — 84300 ASSAY OF URINE SODIUM: CPT

## 2017-08-23 PROCEDURE — 99285 EMERGENCY DEPT VISIT HI MDM: CPT | Mod: 25

## 2017-08-23 PROCEDURE — 81001 URINALYSIS AUTO W/SCOPE: CPT

## 2017-08-23 PROCEDURE — 76770 US EXAM ABDO BACK WALL COMP: CPT

## 2017-08-23 PROCEDURE — 82570 ASSAY OF URINE CREATININE: CPT

## 2017-08-23 PROCEDURE — 83880 ASSAY OF NATRIURETIC PEPTIDE: CPT

## 2017-08-23 PROCEDURE — 84540 ASSAY OF URINE/UREA-N: CPT

## 2017-08-23 PROCEDURE — 51702 INSERT TEMP BLADDER CATH: CPT

## 2017-08-23 PROCEDURE — 80048 BASIC METABOLIC PNL TOTAL CA: CPT

## 2017-08-23 PROCEDURE — 84484 ASSAY OF TROPONIN QUANT: CPT

## 2017-08-23 PROCEDURE — 83036 HEMOGLOBIN GLYCOSYLATED A1C: CPT

## 2017-08-23 PROCEDURE — 82550 ASSAY OF CK (CPK): CPT

## 2017-08-23 PROCEDURE — 85027 COMPLETE CBC AUTOMATED: CPT

## 2017-08-23 PROCEDURE — 87086 URINE CULTURE/COLONY COUNT: CPT

## 2017-08-23 PROCEDURE — 83735 ASSAY OF MAGNESIUM: CPT

## 2017-08-23 PROCEDURE — 93005 ELECTROCARDIOGRAM TRACING: CPT | Mod: XU

## 2017-08-23 PROCEDURE — 99232 SBSQ HOSP IP/OBS MODERATE 35: CPT

## 2017-08-23 PROCEDURE — 82553 CREATINE MB FRACTION: CPT

## 2017-08-23 PROCEDURE — 84100 ASSAY OF PHOSPHORUS: CPT

## 2017-08-23 PROCEDURE — 80053 COMPREHEN METABOLIC PANEL: CPT

## 2017-08-23 PROCEDURE — 71045 X-RAY EXAM CHEST 1 VIEW: CPT

## 2017-08-23 RX ORDER — ASPIRIN/CALCIUM CARB/MAGNESIUM 324 MG
1 TABLET ORAL
Qty: 0 | Refills: 0 | COMMUNITY
Start: 2017-08-23

## 2017-08-23 RX ORDER — LOSARTAN POTASSIUM 100 MG/1
1 TABLET, FILM COATED ORAL
Qty: 0 | Refills: 0 | COMMUNITY
Start: 2017-08-23

## 2017-08-23 RX ORDER — LIDOCAINE 4 G/100G
1 CREAM TOPICAL
Qty: 0 | Refills: 0 | COMMUNITY

## 2017-08-23 RX ORDER — FUROSEMIDE 40 MG
1 TABLET ORAL
Qty: 0 | Refills: 0 | COMMUNITY
Start: 2017-08-23

## 2017-08-23 RX ORDER — TAMSULOSIN HYDROCHLORIDE 0.4 MG/1
1 CAPSULE ORAL
Qty: 0 | Refills: 0 | COMMUNITY
Start: 2017-08-23

## 2017-08-23 RX ORDER — LOSARTAN POTASSIUM 100 MG/1
50 TABLET, FILM COATED ORAL DAILY
Qty: 0 | Refills: 0 | Status: DISCONTINUED | OUTPATIENT
Start: 2017-08-23 | End: 2017-08-23

## 2017-08-23 RX ORDER — CARVEDILOL PHOSPHATE 80 MG/1
0 CAPSULE, EXTENDED RELEASE ORAL
Qty: 0 | Refills: 0 | COMMUNITY

## 2017-08-23 RX ORDER — CARVEDILOL PHOSPHATE 80 MG/1
1 CAPSULE, EXTENDED RELEASE ORAL
Qty: 0 | Refills: 0 | COMMUNITY
Start: 2017-08-23

## 2017-08-23 RX ORDER — ROSUVASTATIN CALCIUM 5 MG/1
0 TABLET ORAL
Qty: 0 | Refills: 0 | COMMUNITY

## 2017-08-23 RX ORDER — FINASTERIDE 5 MG/1
1 TABLET, FILM COATED ORAL
Qty: 0 | Refills: 0 | COMMUNITY
Start: 2017-08-23

## 2017-08-23 RX ORDER — FUROSEMIDE 40 MG
40 TABLET ORAL DAILY
Qty: 0 | Refills: 0 | Status: DISCONTINUED | OUTPATIENT
Start: 2017-08-23 | End: 2017-08-23

## 2017-08-23 RX ORDER — FUROSEMIDE 40 MG
1 TABLET ORAL
Qty: 0 | Refills: 0 | COMMUNITY

## 2017-08-23 RX ORDER — LOSARTAN POTASSIUM 100 MG/1
0 TABLET, FILM COATED ORAL
Qty: 0 | Refills: 0 | COMMUNITY

## 2017-08-23 RX ORDER — LOSARTAN POTASSIUM 100 MG/1
25 TABLET, FILM COATED ORAL DAILY
Qty: 0 | Refills: 0 | Status: DISCONTINUED | OUTPATIENT
Start: 2017-08-23 | End: 2017-08-23

## 2017-08-23 RX ORDER — ASPIRIN/CALCIUM CARB/MAGNESIUM 324 MG
0 TABLET ORAL
Qty: 0 | Refills: 0 | COMMUNITY

## 2017-08-23 RX ORDER — CARVEDILOL PHOSPHATE 80 MG/1
25 CAPSULE, EXTENDED RELEASE ORAL EVERY 12 HOURS
Qty: 0 | Refills: 0 | Status: DISCONTINUED | OUTPATIENT
Start: 2017-08-23 | End: 2017-08-23

## 2017-08-23 RX ORDER — FINASTERIDE 5 MG/1
0 TABLET, FILM COATED ORAL
Qty: 0 | Refills: 0 | COMMUNITY

## 2017-08-23 RX ORDER — TAMSULOSIN HYDROCHLORIDE 0.4 MG/1
1 CAPSULE ORAL
Qty: 30 | Refills: 0 | OUTPATIENT
Start: 2017-08-23 | End: 2017-09-22

## 2017-08-23 RX ADMIN — Medication 1 SUPPOSITORY(S): at 10:37

## 2017-08-23 RX ADMIN — Medication 40 MILLIGRAM(S): at 06:03

## 2017-08-23 RX ADMIN — Medication 1: at 18:35

## 2017-08-23 RX ADMIN — CEFTRIAXONE 100 GRAM(S): 500 INJECTION, POWDER, FOR SOLUTION INTRAMUSCULAR; INTRAVENOUS at 17:29

## 2017-08-23 RX ADMIN — CARVEDILOL PHOSPHATE 25 MILLIGRAM(S): 80 CAPSULE, EXTENDED RELEASE ORAL at 17:35

## 2017-08-23 RX ADMIN — HEPARIN SODIUM 5000 UNIT(S): 5000 INJECTION INTRAVENOUS; SUBCUTANEOUS at 06:03

## 2017-08-23 RX ADMIN — Medication 1: at 13:27

## 2017-08-23 RX ADMIN — CARVEDILOL PHOSPHATE 25 MILLIGRAM(S): 80 CAPSULE, EXTENDED RELEASE ORAL at 06:03

## 2017-08-23 RX ADMIN — Medication 81 MILLIGRAM(S): at 10:37

## 2017-08-23 RX ADMIN — LOSARTAN POTASSIUM 25 MILLIGRAM(S): 100 TABLET, FILM COATED ORAL at 11:42

## 2017-08-23 RX ADMIN — HEPARIN SODIUM 5000 UNIT(S): 5000 INJECTION INTRAVENOUS; SUBCUTANEOUS at 13:28

## 2017-08-23 RX ADMIN — FINASTERIDE 5 MILLIGRAM(S): 5 TABLET, FILM COATED ORAL at 10:36

## 2017-08-23 NOTE — DISCHARGE NOTE ADULT - CARE PLAN
Principal Discharge DX:	Urinary tract infection  Goal:	No clinical s/s of active infection  Instructions for follow-up, activity and diet:	please follow up with DR Johnson for results for urine culture  Call you Health care provider upon arrival home to make a one week follow up appointment.  If you develop fever, chills, malaise, or change in mental status call your Health Care Provider or go to the Emergency Department.  Nutrition is important, eat small frequent meals to help ensure you get adequate calories.  Do not stay in bed all day!  Increase your activity daily as tolerated.  Secondary Diagnosis:	JHON (acute kidney injury)  Instructions for follow-up, activity and diet:	Avoid taking (NSAIDs) - (ex: Ibuprofen, Advil, Celebrex, Naprosyn)  Avoid taking any nephrotoxic agents (can harm kidneys) - Intravenous contrast for diagnostic testing, combination cold medications.  Have all medications adjusted for your renal function by your Health Care Provider.  Blood pressure control is important.  Take all medication as prescribed.  Secondary Diagnosis:	CHF (congestive heart failure)  Instructions for follow-up, activity and diet:	Weigh yourself daily.  If you gain 3lbs in 3 days, or 5lbs in a week call your Health Care Provider.  Do not eat or drink foods containing more than 2000mg of salt (sodium) in your diet every day.  Call your Health Care Provider if you have any swelling or increased swelling in your feet, ankles, and/or stomach.  Take all of your medication as directed.  If you become dizzy call your Health Care Provider.  Secondary Diagnosis:	Bladder calculi  Instructions for follow-up, activity and diet:	please follow up with DR Culp Urologist  Lesli to leg bag   Please Monitor  your fluid intake and urine out put

## 2017-08-23 NOTE — DISCHARGE NOTE ADULT - PLAN OF CARE
No clinical s/s of active infection please follow up with DR Johnson for results for urine culture  Call you Health care provider upon arrival home to make a one week follow up appointment.  If you develop fever, chills, malaise, or change in mental status call your Health Care Provider or go to the Emergency Department.  Nutrition is important, eat small frequent meals to help ensure you get adequate calories.  Do not stay in bed all day!  Increase your activity daily as tolerated. Avoid taking (NSAIDs) - (ex: Ibuprofen, Advil, Celebrex, Naprosyn)  Avoid taking any nephrotoxic agents (can harm kidneys) - Intravenous contrast for diagnostic testing, combination cold medications.  Have all medications adjusted for your renal function by your Health Care Provider.  Blood pressure control is important.  Take all medication as prescribed. Weigh yourself daily.  If you gain 3lbs in 3 days, or 5lbs in a week call your Health Care Provider.  Do not eat or drink foods containing more than 2000mg of salt (sodium) in your diet every day.  Call your Health Care Provider if you have any swelling or increased swelling in your feet, ankles, and/or stomach.  Take all of your medication as directed.  If you become dizzy call your Health Care Provider. please follow up with DR Culp Urologist  [place] to leg bag   Please Monitor  your fluid intake and urine out put

## 2017-08-23 NOTE — PROVIDER CONTACT NOTE (OTHER) - ASSESSMENT
A&OX4, ASYMPTOMATIC, no c/o chest pain, SOB/ENRIQUEZ; /70, HR 62, T 98.4, O2 98%, RR 18;  patient was sleeping at time of episode

## 2017-08-23 NOTE — DISCHARGE NOTE ADULT - CARE PROVIDER_API CALL
John Cerda (MD), Urology  [address] [place] [address]  [address] [place] [address]  Phone: [phone]  Fax: [phone] Jo Culp (MD; MPH), Urology  [address] [facility]  [address] [place] [address]  Phone: [phone]  Fax: [phone] Jo Culp; MPH), Urology  [address] [facility]  [address] [place] [address]  Phone: [phone]  Fax: [phone]    Davonte Johnson (MD), Infectious Disease; Internal Medicine  [address] [place] [address]  Phone: [phone]  Fax: [phone]    Albin Licea), Cardiovascular Disease; Internal Medicine  [address] [facility]  [address]  [place] [address]  Phone: [phone]  Fax: [phone]

## 2017-08-23 NOTE — CHART NOTE - NSCHARTNOTEFT_GEN_A_CORE
Asked to facilitate discharge by DR Lozano this AM . Pt seen and examined , no active complaints . Unhappy about being discharge planned to home with  a Garay , Spoke with Urology PA and got clearance for discharge with close follow up with DR Culp early next week . Staff instructed to teach Pt  with Garay care , Home care arranged . Pt was being treated for a UTI , Urine cultures still pending , Urgent consult called in to ID to clear the Pt,  DR Johnson cleared the Pt for discharge after 3 rd dose of IVBAX  and no PO abx recommended for discharge  .Pt still concerned about going home  with a garay , wanted the Urology procedure done with this Hospital stay instructed again with his follow up appointment Pt still not happy and son and son in law at bedside , is asking to speak to Urology , ? about Flomax , Pt was feeling dizzy on flomax  a few years ago . Pt's Flomax  doses started since yesterday  no c/o dizziness  now . Pt and family awaiting for Urology PA . DR Blake rain, awaiting response .       Alphonso Calvo NP-C [id number] Asked to facilitate discharge by DR Lozano this AM . Pt seen and examined , no active complaints . Unhappy about being discharge planned to home with  a Garay , Spoke with Urology PA and got clearance for discharge with close follow up with DR Culp early next week . Staff instructed to teach Pt  with Garay care , Home care arranged . Pt was being treated for a UTI , Urine cultures still pending , Urgent consult called in to ID to clear the Pt,  DR Johnson cleared the Pt for discharge after 3 rd dose of IVBAX  and no PO abx recommended for discharge  .Pt still concerned about going home  with a garay , wanted the Urology procedure done with this Hospital stay instructed again with his follow up appointment Pt still not happy and son and son in law at bedside , is asking to speak to Urology , ? about Flomax , Pt was feeling dizzy on flomax  a few years ago . Pt's Flomax  doses started since yesterday  no c/o dizziness  now . Pt and family awaiting for Urology PA . DR Lozano paged, awaiting response .       Alphonso Calvo NP-C [id number]        08/23/2017 at 2000 Addendum to previous Note :  Pt and family insisting on Going Home suyapa Urology PA Karolina  contacted as per her the consult resident in [place] ,  Pt and family waiting since 0530 pm no one able to speak to the family about the possible urology interventions for this Pt, Updated the Pt and family to the best of My knowledge,  DR Lozano informed about ID clearance and Urology follow up appointments, she agreed for discharge . Pt is being discharged home with Home care, with close follow up with Urology on Monday 08/28/2017 at 1130 with DR Culp, may d/c IVL and d/c Pt home .      Alphonso Calvo NP-C  [id number]

## 2017-08-23 NOTE — CONSULT NOTE ADULT - ASSESSMENT
pt with chf and BPH   has garay that he will likely need to go home with. no signs of symptoms of infection but had slight dysuria prior to foly/ has received 3 days of ceftriaxone which should be adequate  never had signs  of complicated UTI can dc all antibiotics at time of discharge pt will call me about uc result that is pending , but I dont anticipate needing to treat with additional ab. discussed with family and nursing in detail

## 2017-08-23 NOTE — DISCHARGE NOTE ADULT - PROVIDER TOKENS
LUZ MARINA:'[id number]' LUZ MARINA:'[id number]' TOKEN:'[id number]',TOKEN:'[id number]',TOKEN:'[id number]'

## 2017-08-23 NOTE — PROGRESS NOTE ADULT - SUBJECTIVE AND OBJECTIVE BOX
Patient seen and evaluated @ 1845.  Chief Complaint: Patient is a 71y old  Male who presents with a chief complaint of difficulty urinating (21 Aug 2017 20:42)      HPI:  70 yo man with PMH of CABG, hx of CHF EF 25% in , DMT2, BPH presenting with difficulty urinating since Saturday night with dysuria. Patient states that he has had straining on urination and dribbling. Denies hematuria. Endorses sweats. Denies fevers and chills. Denies abdominal pain, flank pain, nausea, emesis. Patient has been on Finasteride. Has tried Flomax in the distant past which he states didn't control his symptoms at that time. Denies history of UTI. Patient also states that he has had shortness of breath that has worsened, states that it was very difficult for him to sleep on Saturday. Patient states he needed to sit more upright. Endores baseline ankle edema. He admits to skipping Lasix in the past 2 days 2/2 to difficulty urination. Denies CP, palpitation, wheeze. Endorses baseline cough. Patient states that he used his wife's BiPAP/CPAP overnight and felt improvement of symptoms. He has not been formally diagnosed BENITO, OHS. (21 Aug 2017 20:42)    Cardiac history: I have known patient since . Cardiomyopathy with CAD, treated with mammary to his LAD. RCA and circumflex so small and occluded, no bypass could be done.  Nonsustained VT, but not inducible at EP study. Has refused AICD since then. Did not do well on digoxin, and has refused Entresto so far.  Most recently seen in April of this year. Most recent echo in May of 2016 with LVEF 28%, mild to moderate MR. Normal RV size and function with RVSP 33 mm mercury.    PMH:   H/O hemorrhoids  CHF (congestive heart failure)  Hyperlipidemia  MI (myocardial infarction)  CAD (coronary artery disease)  Former smoker    PSH:   History of open heart surgery  Hx of tonsillectomy  Renal stone    Medications:   finasteride 5 milliGRAM(s) Oral daily  aspirin enteric coated 81 milliGRAM(s) Oral daily  atorvastatin 20 milliGRAM(s) Oral at bedtime  carvedilol 25 milliGRAM(s) Oral every 12 hours  insulin lispro (HumaLOG) corrective regimen sliding scale   SubCutaneous three times a day before meals  insulin lispro (HumaLOG) corrective regimen sliding scale   SubCutaneous at bedtime  dextrose 5%. 1000 milliLiter(s) IV Continuous <Continuous>  dextrose Gel 1 Dose(s) Oral once PRN  dextrose 50% Injectable 12.5 Gram(s) IV Push once  dextrose 50% Injectable 25 Gram(s) IV Push once  dextrose 50% Injectable 25 Gram(s) IV Push once  glucagon  Injectable 1 milliGRAM(s) IntraMuscular once PRN  cefTRIAXone   IVPB 1 Gram(s) IV Intermittent every 24 hours  heparin  Injectable 5000 Unit(s) SubCutaneous every 8 hours  tamsulosin 0.4 milliGRAM(s) Oral at bedtime  furosemide   Injectable 40 milliGRAM(s) IV Push daily  hydrocortisone hemorrhoidal Suppository 1 Suppository(s) Rectal daily  aluminum hydroxide/magnesium hydroxide/simethicone Suspension 30 milliLiter(s) Oral every 6 hours PRN    Allergies:  No Known Allergies    FAMILY HISTORY:  No pertinent family history in first degree relatives    Social History:  Smoking: Former, D/C .  Alcohol: None now.  Drugs:    Review of Systems:  Constitutional: [ -] Fever [- ] Chills [ ] Fatigue [ ] Weight change   HEENT: [ ] Blurred vision [ ] Eye Pain [ ] Headache [ ] Runny nose [ ] Sore Throat   Respiratory: [ ] Cough [ ] Wheezing [- ] Shortness of breath  Cardiovascular: [ -] Chest Pain [ -] Palpitations [ ] ENRIQUEZ [ ] PND [ ] Orthopnea  Gastrointestinal: [ -] Abdominal Pain [ ] Diarrhea [ ] Constipation [ ] Hemorrhoids [ ] Nausea [ ] Vomiting  Genitourinary: [+ ] Nocturia [- ] Dysuria [ -] Incontinence  Extremities: [ -] Swelling [ ] Joint Pain  Neurologic: [- ] Focal deficit [ ] Paresthesias [ ] Syncope  Lymphatic: [ ] Swelling [ ] Lymphadenopathy   Skin: [ ] Rash [ ] Ecchymoses [ ] Wounds [ ] Lesions  Psychiatry: [ ] Depression [ ] Suicidal/Homicidal Ideation [ ] Anxiety [ ] Sleep Disturbances  [ -] 10 point review of systems is otherwise negative except as mentioned above            [ ]Unable to obtain    Physical Exam:  Vital Signs Last 24 Hrs  T(C): 36.9 (23 Aug 2017 04:32), Max: 36.9 (23 Aug 2017 04:32)  T(F): 98.4 (23 Aug 2017 04:32), Max: 98.4 (23 Aug 2017 04:32)  HR: 71 (23 Aug 2017 06:02) (62 - 77)  BP: 115/70 (23 Aug 2017 06:02) (109/72 - 129/78)  BP(mean): --  RR: 18 (23 Aug 2017 04:32) (18 - 19)  SpO2: 98% (23 Aug 2017 04:32) (94% - 98%)    Appearance: WD, WN, NAD, obese  Eyes: [ ] PERRL [ ] EOMI  HENT: [ +] Normal oral mucosa [+ ]NC/AT  Neck: No JVD at 90 degrees. Normal carotid upstrokes without bruits or murmurs.  Cardiovascular: Normal PMI. Normal S1, S2 physiologically split. No S3 or S4. II/VI apical systolic murmur.  Respiratory: [+ ] Clear to auscultation bilaterally  Gastrointestinal: Soft.  Non-tender. No hepatosplenomegally.  Extremities: No edema. Pulses 2+ bilaterally symmetric except diminished pedal.  Musculoskeletal: [+ ] No clubbing [ ] No joint deformity   Neurologic: [+ ] Non-focal  Lymphatic: [ ] No lymphadenopathy  Psychiatry: [+ ] AAOx3 [ +] Mood & affect appropriate  Skin: [ ] No rashes [ ] No ecchymoses [ +] No cyanosis    Cardiovascular Diagnostic Testing:  ECG:< from: 12 Lead ECG (17 @ 15:37) >  Ventricular Rate 71 BPM    Atrial Rate 71 BPM    P-R Interval 220 ms    QRS Duration 122 ms     ms    QTc 458 ms    P Axis 50 degrees    R Axis -30 degrees    T Axis 126 degrees    Diagnosis Line SINUS RHYTHM WITH 1ST DEGREE A-V BLOCK WITH PREMATURE SUPRAVENTRICULAR COMPLEXES  LEFT AXIS DEVIATION  LEFT BUNDLE BRANCH BLOCK  ABNORMAL ECG    < end of copied text >      Echo: 2016 as mentioned in HPI    Stress Testing:    Cath:    Interpretation of Telemetry: NSR, VPC's occassional couplets, occasional bigeminy.    Imaging:< from: Xray Chest 1 View AP/PA (17 @ 16:37) >  XAM:  CHEST SINGLE AP OR PA                            PROCEDURE DATE:  2017            INTERPRETATION:    CLINICAL INDICATION: Shortness of breath    TECHNIQUE: Portable frontal view of the chest.    COMPARISON: Frontal chest radiograph from 2012.    FINDINGS:   There are sternotomy wires, status post CABG.  Cardiac size is enlarged.  The lungs are clear.  No pleural effusions or pneumothorax.  No acute osseous abnormality.      IMPRESSION:   The lungs are clear.    < end of copied text >      Labs:                                            11.8   10.3  )-----------( 208      ( 23 Aug 2017 06:04 )             36.1     08-    141  |  101  |  32<H>  ----------------------------<  136<H>  4.4   |  24  |  1.54<H>    Ca    9.1      23 Aug 2017 06:04  Phos  3.9       Mg     2.1         TPro  7.2  /  Alb  4.1  /  TBili  0.7  /  DBili  x   /  AST  36  /  ALT  47<H>  /  AlkPhos  78  08    CARDIAC MARKERS ( 22 Aug 2017 05:29 )  x     / <0.01 ng/mL / 154 U/L / x     / 1.8 ng/mL  CARDIAC MARKERS ( 21 Aug 2017 22:06 )  x     / <0.01 ng/mL / 184 U/L / x     / 2.4 ng/mL  CARDIAC MARKERS ( 21 Aug 2017 15:15 )  x     / <0.01 ng/mL / 130 U/L / x     / 2.2 ng/mL        Urinalysis Basic - ( 21 Aug 2017 15:15 )    Color: Yellow / Appearance: x / S.010 / pH: x  Gluc: x / Ketone: Negative  / Bili: Negative / Urobili: Negative   Blood: x / Protein: 100 mg/dL / Nitrite: Negative   Leuk Esterase: Moderate / RBC: 5-10 /HPF / WBC 10-25 /HPF   Sq Epi: x / Non Sq Epi: x / Bacteria: Few /HPF

## 2017-08-23 NOTE — PHYSICAL THERAPY INITIAL EVALUATION ADULT - PERTINENT HX OF CURRENT PROBLEM, REHAB EVAL
70 yo man with PMH of CABG, hx of CHF EF 25% in 2012, DMT2, BPH presenting with difficulty urinating since Saturday night with dysuria with UA suggestive of UTI and BMP notable for  JHON.

## 2017-08-23 NOTE — DISCHARGE NOTE ADULT - CARE PROVIDERS DIRECT ADDRESSES
,DirectAddress_Unknown ,[email or web address]

## 2017-08-23 NOTE — CHART NOTE - NSCHARTNOTEFT_GEN_A_CORE
Patient with brief episode of bradycardia of 47 for less than 2 seconds while asleep. Patient asymptomatic in NAD. Denies chest pain, sob, headache, blurred vision, palpitations, n/v/ fevers, chills    Vital Signs Last 24 Hrs  T(C): 36.9 (23 Aug 2017 04:32), Max: 36.9 (23 Aug 2017 04:32)  T(F): 98.4 (23 Aug 2017 04:32), Max: 98.4 (23 Aug 2017 04:32)  HR: 62 (23 Aug 2017 04:32) (62 - 77)  BP: 114/70 (23 Aug 2017 04:32) (109/72 - 129/78)  BP(mean): --  RR: 18 (23 Aug 2017 04:32) (18 - 19)  SpO2: 98% (23 Aug 2017 04:32) (94% - 98%)    MEDICATIONS  (STANDING):  finasteride 5 milliGRAM(s) Oral daily  aspirin enteric coated 81 milliGRAM(s) Oral daily  atorvastatin 20 milliGRAM(s) Oral at bedtime  carvedilol 25 milliGRAM(s) Oral every 12 hours  insulin lispro (HumaLOG) corrective regimen sliding scale   SubCutaneous three times a day before meals  insulin lispro (HumaLOG) corrective regimen sliding scale   SubCutaneous at bedtime  dextrose 5%. 1000 milliLiter(s) (50 mL/Hr) IV Continuous <Continuous>  dextrose 50% Injectable 12.5 Gram(s) IV Push once  dextrose 50% Injectable 25 Gram(s) IV Push once  dextrose 50% Injectable 25 Gram(s) IV Push once  cefTRIAXone   IVPB 1 Gram(s) IV Intermittent every 24 hours  heparin  Injectable 5000 Unit(s) SubCutaneous every 8 hours  tamsulosin 0.4 milliGRAM(s) Oral at bedtime  furosemide   Injectable 40 milliGRAM(s) IV Push daily  hydrocortisone hemorrhoidal Suppository 1 Suppository(s) Rectal daily    MEDICATIONS  (PRN):  dextrose Gel 1 Dose(s) Oral once PRN Blood Glucose LESS THAN 70 milliGRAM(s)/deciliter  glucagon  Injectable 1 milliGRAM(s) IntraMuscular once PRN Glucose LESS THAN 70 milligrams/deciliter  aluminum hydroxide/magnesium hydroxide/simethicone Suspension 30 milliLiter(s) Oral every 6 hours PRN Dyspepsia    Physical exam  Neuro: awake alert and oriented x3 no focal deficits  Cards: RRR   resp cta eve  gi bsx4 soft nt/nd    72 yo man with PMH of CABG, hx of CHF EF 25% in 2012, DMT2, BPH presenting with difficulty urinating since Saturday night with dysuria admitted with sob and juanjose now with a brief episode of bradycardia    plan  c/w meds and follow parameters    Will continue to monitor and endorse to primary team, attending to follow  Susan Aguilar NO  [id number]

## 2017-08-23 NOTE — DISCHARGE NOTE ADULT - HOSPITAL COURSE
To be completed by attending 71 year old male PMH CABG, chronic systolic CHF EF 25%, DM(II), BPH presenting with difficulty urinating since Saturday night with dysuria. Patient states that he has had straining on urination and dribbling. Denies hematuria.  Denies fevers and chills. Patient has been on Finasteride. Has tried Flomax in the distant past which he states didn't control his symptoms at that time. Patient also states that he has had shortness of breath that has worsened, states that it was very difficult for him to sleep on Saturday. Patient states he needed to sit more upright. Endores baseline ankle edema. States that he used his wife's CPAP overnight and felt improvement of symptoms. He has not been formally diagnosed BENITO.     Admitted to telemetry. CPK all normal. CBC WNL. SMA-7 shows stable CKD III with creatinine 1.54. CXR was clear. EKG showed NSR with LBBB.     Ballesteros was placed for urine retension. Flomax was added to proscar. Renal sonogram showed no hydronephrosis, enlarged prostate was seen on bladder sonogram. Discharged to home. See med list. 71 year old male PMH CABG, chronic systolic CHF EF 25%, DM(II), BPH presenting with difficulty urinating since Saturday night with dysuria. Patient states that he has had straining on urination and dribbling. Denies hematuria.  Denies fevers and chills. Patient has been on Finasteride. Has tried Flomax in the distant past which he states didn't control his symptoms at that time. Patient also states that he has had shortness of breath that has worsened, states that it was very difficult for him to sleep on Saturday. Patient states he needed to sit more upright. Endores baseline ankle edema. States that he used his wife's CPAP overnight and felt improvement of symptoms. He has not been formally diagnosed BENITO.     Admitted to telemetry. CPK all normal. CBC WNL. SMA-7 shows stable CKD III with creatinine 1.54. CXR was clear. EKG showed NSR with LBBB.     Ballesteros was placed for urine retension. Flomax was added to proscar. Renal sonogram showed no hydronephrosis, enlarged prostate was seen on bladder sonogram. Discharged to home. Follow-up with Urology as outpatient for TOV. See med list.

## 2017-08-23 NOTE — DISCHARGE NOTE ADULT - MEDICATION SUMMARY - MEDICATIONS TO TAKE
I will START or STAY ON the medications listed below when I get home from the hospital:    finasteride 5 mg oral tablet  -- 1 tab(s) by mouth once a day  -- Indication: For bph    aspirin 81 mg oral delayed release tablet  -- 1 tab(s) by mouth once a day  -- Indication: For Antiplatlet    losartan 25 mg oral tablet  -- 1 tab(s) by mouth once a day  -- Indication: For Htn    tamsulosin 0.4 mg oral capsule  -- 1 cap(s) by mouth once a day (at bedtime)  -- Indication: For bph    Glucophage 500 mg oral tablet  -- 1 tab(s) by mouth 2 times a day  -- Indication: For Dm type 2    carvedilol 25 mg oral tablet  -- 1 tab(s) by mouth every 12 hours  -- Indication: For Htn    furosemide 40 mg oral tablet  -- 1 tab(s) by mouth once a day  -- Indication: For Diuretics    docusate sodium 100 mg oral capsule  --  by mouth , As Needed  -- Indication: For Stool softners     Centrum Silver oral tablet  -- 1 tab(s) by mouth once a day  -- Indication: For Supplement

## 2017-08-23 NOTE — CONSULT NOTE ADULT - SUBJECTIVE AND OBJECTIVE BOX
Patient is a 71y old  Male who presents with a chief complaint of difficulty urinating (23 Aug 2017 12:45)    HPI:  72 yo man with PMH of CABG, hx of CHF EF 25% in 2012, DMT2, BPH presenting with difficulty urinating since Saturday night with dysuria. Patient states that he has had straining on urination and dribbling. Denies hematuria. Endorses sweats. Denies fevers and chills. Denies abdominal pain, flank pain, nausea, emesis. Patient has been on Finasteride. Has tried Flomax in the distant past which he states didn't control his symptoms at that time. Denies history of UTI. Patient also states that he has had shortness of breath that has worsened, states that it was very difficult for him to sleep on Saturday. Patient states he needed to sit more upright. Endores baseline ankle edema. He admits to skipping Lasix in the past 2 days 2/2 to difficulty urination. Denies CP, palpitation, wheeze. Endorses baseline cough. Patient states that he used his wife's BiPAP/CPAP overnight and felt improvement of symptoms. He has not been formally diagnosed BENITO, OHS. (21 Aug 2017 20:42)  no fever chills  no swats  sob better      PAST MEDICAL & SURGICAL HISTORY:  H/O hemorrhoids  CHF (congestive heart failure)  Hyperlipidemia  MI (myocardial infarction)  CAD (coronary artery disease)  Active smoker  History of open heart surgery  Hx of tonsillectomy  Renal stone: surgically removed      Social history:    FAMILY HISTORY:  No pertinent family history in first degree relatives    REVIEW OF SYSTEMS  General:	Denies any malaise fatigue or chills. Fevers absent    Skin:No rash  	  Ophthalmologic:Denies any visual complaints,discharge redness or photophobia  	  ENMT:No nasal discharge,headache,sinus congestion or throat pain.No dental complaints    Respiratory and Thorax:No cough,sputum or chest pain.Denies shortness of breath  	  Cardiovascular:	No chest pain,palpitaions or dizziness    Gastrointestinal:	NO nausea,abdominal pain or diarrhea.    Genitourinary:	positve garay  Musculoskeletal:	No joint swelling or pain.No weakness    Neurological:No confusion,diziness.No extremity weakness.No bladder or bowel incontinence	    Psychiatric:No delusions or hallucinations	    Hematology/Lymphatics:	No LN swelling.No gum bleeding     Endocrine:	No recent weight gain or loss.No abnormal heat/cold intolerance    Allergic/Immunologic:	No hives or rash   Allergies    No Known Allergies    Intolerances        Antimicrobials:    cefTRIAXone   IVPB 1 Gram(s) IV Intermittent every 24 hours        Vital Signs Last 24 Hrs  T(C): 36.5 (23 Aug 2017 12:05), Max: 36.9 (23 Aug 2017 04:32)  T(F): 97.7 (23 Aug 2017 12:05), Max: 98.4 (23 Aug 2017 04:32)  HR: 62 (23 Aug 2017 12:05) (62 - 77)  BP: 98/62 (23 Aug 2017 12:05) (98/62 - 129/78)  BP(mean): --  RR: 18 (23 Aug 2017 12:05) (18 - 19)  SpO2: 92% (23 Aug 2017 12:05) (92% - 98%)    PHYSICAL EXAM:Pleasant patient in no acute distress.      Constitutional:Comfortable.Awake and alert  No cachexia     Eyes:PERRL EOMI.NO discharge or conjunctival injection    ENMT:No sinus tenderness.No thrush.No pharyngeal exudate or erythema.Fair dental hygiene    Neck:Supple,No LN,no JVD        Gastrointestinal:Soft BS(+) no tenderness no masses ,No rebound or guarding    Genitourinary:No CVA tendereness     Rectal:    Extremities:No cyanosis,clubbing or edema.    Vascular:peripheral pulses felt    Neurological:AAO X 3,No grossly focal deficits    Skin:No rash     Lymph Nodes:No palpable LNs    Musculoskeletal:No joint swelling or LOM    Psychiatric:Affect normal.                                11.8   10.3  )-----------( 208      ( 23 Aug 2017 06:04 )             36.1         08-23    141  |  101  |  32<H>  ----------------------------<  136<H>  4.4   |  24  |  1.54<H>    Ca    9.1      23 Aug 2017 06:04  Phos  3.9     08-22  Mg     2.1     08-23    TPro  7.2  /  Alb  4.1  /  TBili  0.7  /  DBili  x   /  AST  36  /  ALT  47<H>  /  AlkPhos  78  08-21      RECENT CULTURES:      MICROBIOLOGY:          Radiology:      Assessment:        Recommendations and Plan:    Pager [phone]  After 5 pm/weekends or if no response :[phone]

## 2017-08-23 NOTE — DISCHARGE NOTE ADULT - PATIENT PORTAL LINK FT
“You can access the Follow[employer] Patient Portal, offered by [facility], by registering with the following website: [email or web address]”

## 2017-08-23 NOTE — DISCHARGE NOTE ADULT - ADDITIONAL INSTRUCTIONS
please Keep the garay  catheter in until you are seen by DR Cerda Urologist please Keep the garay  catheter in until you are seen by DR Culp Urologist, please arrive the office at 11 AM on 08/28/2017 , please bring your discharge paper work and Photo ID and insurance card please Keep the Ballesteros  catheter in until you are seen by DR Culp Urologist,  at 1130 , please arrive the office at 11 AM on 08/28/2017 , please bring your discharge paper work , Photo ID and insurance card

## 2017-08-23 NOTE — PROGRESS NOTE ADULT - ASSESSMENT
72 yo man with LV dysfunction and CAD, admitted with SOB as part of severe urinary retention. Symptoms of SOB resolved with resolution of non-urinating. No CP. No ECG acute changes. No positive cardiac enzymes.

## 2017-08-24 LAB
CULTURE RESULTS: NO GROWTH — SIGNIFICANT CHANGE UP
SPECIMEN SOURCE: SIGNIFICANT CHANGE UP

## 2017-08-28 ENCOUNTER — APPOINTMENT (OUTPATIENT)
Dept: UROLOGY | Facility: CLINIC | Age: 72
End: 2017-08-28
Payer: MEDICARE

## 2017-08-28 VITALS
HEIGHT: 73 IN | HEART RATE: 59 BPM | WEIGHT: 294 LBS | RESPIRATION RATE: 18 BRPM | BODY MASS INDEX: 38.97 KG/M2 | TEMPERATURE: 97.7 F | SYSTOLIC BLOOD PRESSURE: 118 MMHG | DIASTOLIC BLOOD PRESSURE: 80 MMHG

## 2017-08-28 DIAGNOSIS — F15.90 OTHER STIMULANT USE, UNSPECIFIED, UNCOMPLICATED: ICD-10-CM

## 2017-08-28 PROCEDURE — 99205 OFFICE O/P NEW HI 60 MIN: CPT

## 2017-08-28 RX ORDER — TAMSULOSIN HYDROCHLORIDE 0.4 MG/1
0.4 CAPSULE ORAL
Refills: 0 | Status: ACTIVE | COMMUNITY
Start: 2017-08-28

## 2017-08-31 ENCOUNTER — APPOINTMENT (OUTPATIENT)
Dept: UROLOGY | Facility: CLINIC | Age: 72
End: 2017-08-31
Payer: MEDICARE

## 2017-08-31 ENCOUNTER — MESSAGE (OUTPATIENT)
Age: 72
End: 2017-08-31

## 2017-08-31 VITALS
RESPIRATION RATE: 18 BRPM | SYSTOLIC BLOOD PRESSURE: 120 MMHG | BODY MASS INDEX: 37.77 KG/M2 | DIASTOLIC BLOOD PRESSURE: 72 MMHG | HEART RATE: 72 BPM | WEIGHT: 285 LBS | HEIGHT: 73 IN | TEMPERATURE: 97.9 F

## 2017-08-31 PROCEDURE — 99214 OFFICE O/P EST MOD 30 MIN: CPT | Mod: 25

## 2017-08-31 PROCEDURE — 52000 CYSTOURETHROSCOPY: CPT

## 2017-08-31 RX ORDER — CIPROFLOXACIN HYDROCHLORIDE 500 MG/1
500 TABLET, FILM COATED ORAL
Refills: 0 | Status: COMPLETED | OUTPATIENT
Start: 2017-08-31

## 2017-08-31 RX ADMIN — CIPROFLOXACIN HYDROCHLORIDE 0 MG: 500 TABLET, FILM COATED ORAL at 00:00

## 2017-09-01 ENCOUNTER — APPOINTMENT (OUTPATIENT)
Dept: UROLOGY | Facility: CLINIC | Age: 72
End: 2017-09-01
Payer: MEDICARE

## 2017-09-01 ENCOUNTER — CLINICAL ADVICE (OUTPATIENT)
Age: 72
End: 2017-09-01

## 2017-09-01 VITALS
BODY MASS INDEX: 37.91 KG/M2 | DIASTOLIC BLOOD PRESSURE: 62 MMHG | HEART RATE: 71 BPM | WEIGHT: 286 LBS | TEMPERATURE: 97.6 F | SYSTOLIC BLOOD PRESSURE: 110 MMHG | HEIGHT: 73 IN

## 2017-09-01 PROCEDURE — 51701 INSERT BLADDER CATHETER: CPT

## 2017-09-06 LAB
APPEARANCE: CLEAR
BACTERIA UR CULT: NORMAL
BACTERIA: NEGATIVE
BILIRUBIN URINE: NEGATIVE
BLOOD URINE: ABNORMAL
COLOR: YELLOW
GLUCOSE QUALITATIVE U: NORMAL MG/DL
HYALINE CASTS: 1 /LPF
KETONES URINE: NEGATIVE
LEUKOCYTE ESTERASE URINE: NEGATIVE
MICROSCOPIC-UA: NORMAL
NITRITE URINE: NEGATIVE
PH URINE: 6.5
PROTEIN URINE: ABNORMAL MG/DL
RED BLOOD CELLS URINE: 10 /HPF
SPECIFIC GRAVITY URINE: 1.01
SQUAMOUS EPITHELIAL CELLS: 2 /HPF
UROBILINOGEN URINE: NORMAL MG/DL
WHITE BLOOD CELLS URINE: 2 /HPF

## 2017-09-14 ENCOUNTER — APPOINTMENT (OUTPATIENT)
Dept: CARDIOLOGY | Facility: CLINIC | Age: 72
End: 2017-09-14
Payer: MEDICARE

## 2017-09-14 VITALS
DIASTOLIC BLOOD PRESSURE: 67 MMHG | HEART RATE: 78 BPM | OXYGEN SATURATION: 95 % | WEIGHT: 282 LBS | BODY MASS INDEX: 37.37 KG/M2 | SYSTOLIC BLOOD PRESSURE: 115 MMHG | TEMPERATURE: 97.6 F | HEIGHT: 73 IN

## 2017-09-14 PROCEDURE — 99215 OFFICE O/P EST HI 40 MIN: CPT

## 2017-09-21 ENCOUNTER — OUTPATIENT (OUTPATIENT)
Dept: OUTPATIENT SERVICES | Facility: HOSPITAL | Age: 72
LOS: 1 days | End: 2017-09-21
Payer: MEDICARE

## 2017-09-21 VITALS
DIASTOLIC BLOOD PRESSURE: 64 MMHG | SYSTOLIC BLOOD PRESSURE: 115 MMHG | HEART RATE: 78 BPM | HEIGHT: 75 IN | TEMPERATURE: 99 F | WEIGHT: 276.02 LBS | OXYGEN SATURATION: 95 % | RESPIRATION RATE: 20 BRPM

## 2017-09-21 DIAGNOSIS — N21.0 CALCULUS IN BLADDER: ICD-10-CM

## 2017-09-21 DIAGNOSIS — Z86.79 PERSONAL HISTORY OF OTHER DISEASES OF THE CIRCULATORY SYSTEM: ICD-10-CM

## 2017-09-21 DIAGNOSIS — G47.30 SLEEP APNEA, UNSPECIFIED: ICD-10-CM

## 2017-09-21 DIAGNOSIS — N40.1 BENIGN PROSTATIC HYPERPLASIA WITH LOWER URINARY TRACT SYMPTOMS: ICD-10-CM

## 2017-09-21 DIAGNOSIS — Z98.890 OTHER SPECIFIED POSTPROCEDURAL STATES: Chronic | ICD-10-CM

## 2017-09-21 DIAGNOSIS — N40.0 BENIGN PROSTATIC HYPERPLASIA WITHOUT LOWER URINARY TRACT SYMPTOMS: ICD-10-CM

## 2017-09-21 DIAGNOSIS — R05 COUGH: ICD-10-CM

## 2017-09-21 DIAGNOSIS — Z01.818 ENCOUNTER FOR OTHER PREPROCEDURAL EXAMINATION: ICD-10-CM

## 2017-09-21 PROCEDURE — G0463: CPT

## 2017-09-21 RX ORDER — SODIUM CHLORIDE 9 MG/ML
3 INJECTION INTRAMUSCULAR; INTRAVENOUS; SUBCUTANEOUS EVERY 8 HOURS
Qty: 0 | Refills: 0 | Status: DISCONTINUED | OUTPATIENT
Start: 2017-09-26 | End: 2017-09-27

## 2017-09-21 NOTE — H&P PST ADULT - PMH
Active smoker  quit in june 2012  Bladder calculus    BPH (benign prostatic hyperplasia)    BPH without obstruction/lower urinary tract symptoms    CAD (coronary artery disease)    CHF (congestive heart failure)    Cough in adult patient    DM (diabetes mellitus)    H/O hemorrhoids    History of hypertension    Hyperlipidemia    MI (myocardial infarction)    Neuropathy    Sleep apnea in adult  not diagnosed

## 2017-09-21 NOTE — H&P PST ADULT - HISTORY OF PRESENT ILLNESS
71 yr old male with history of CABG 2012 one vessel CHF DM HTN Obese former Smoker presents with BPH and BPH. Pt had urinary retention Aug 2017 went to ER at [place] had catheterization discharged on the 31 of August and seen by urologist. In the office visit a cystoscope done revealed bladder stones and an enlarged prostate went home with a catheter and removed several days later. Pt schedule for cystoscopy litholapaxy and transurethral resection of prostate with bipolar rectoscope

## 2017-09-21 NOTE — H&P PST ADULT - FAMILY HISTORY
Mother  Still living? No  Family history of chronic congestive heart failure, Age at diagnosis: Age Unknown

## 2017-09-21 NOTE — H&P PST ADULT - LAST ECHOCARDIOGRAM
July 2016 Addendum 09/25/17 at 18:50 per Frances Leach, NP-5/31/16- EF- 28 % , Dr. Mcgraw, Anesthesia aware of it, pt has cardiology clearance , is high risk for surgery

## 2017-09-26 ENCOUNTER — RESULT REVIEW (OUTPATIENT)
Age: 72
End: 2017-09-26

## 2017-09-26 ENCOUNTER — APPOINTMENT (OUTPATIENT)
Dept: UROLOGY | Facility: HOSPITAL | Age: 72
End: 2017-09-26

## 2017-09-26 ENCOUNTER — TRANSCRIPTION ENCOUNTER (OUTPATIENT)
Age: 72
End: 2017-09-26

## 2017-09-26 ENCOUNTER — INPATIENT (INPATIENT)
Facility: HOSPITAL | Age: 72
LOS: 0 days | Discharge: HOME | DRG: 693 | End: 2017-09-27
Attending: SURGERY | Admitting: SURGERY
Payer: MEDICARE

## 2017-09-26 VITALS
OXYGEN SATURATION: 98 % | TEMPERATURE: 98 F | HEIGHT: 75 IN | HEART RATE: 64 BPM | WEIGHT: 276.02 LBS | RESPIRATION RATE: 14 BRPM | DIASTOLIC BLOOD PRESSURE: 61 MMHG | SYSTOLIC BLOOD PRESSURE: 108 MMHG

## 2017-09-26 DIAGNOSIS — Z98.890 OTHER SPECIFIED POSTPROCEDURAL STATES: Chronic | ICD-10-CM

## 2017-09-26 DIAGNOSIS — E11.9 TYPE 2 DIABETES MELLITUS WITHOUT COMPLICATIONS: ICD-10-CM

## 2017-09-26 DIAGNOSIS — N40.1 BENIGN PROSTATIC HYPERPLASIA WITH LOWER URINARY TRACT SYMPTOMS: ICD-10-CM

## 2017-09-26 DIAGNOSIS — Z86.79 PERSONAL HISTORY OF OTHER DISEASES OF THE CIRCULATORY SYSTEM: ICD-10-CM

## 2017-09-26 DIAGNOSIS — N21.0 CALCULUS IN BLADDER: ICD-10-CM

## 2017-09-26 DIAGNOSIS — Z29.9 ENCOUNTER FOR PROPHYLACTIC MEASURES, UNSPECIFIED: ICD-10-CM

## 2017-09-26 DIAGNOSIS — R06.02 SHORTNESS OF BREATH: ICD-10-CM

## 2017-09-26 DIAGNOSIS — N40.0 BENIGN PROSTATIC HYPERPLASIA WITHOUT LOWER URINARY TRACT SYMPTOMS: ICD-10-CM

## 2017-09-26 DIAGNOSIS — Z01.818 ENCOUNTER FOR OTHER PREPROCEDURAL EXAMINATION: ICD-10-CM

## 2017-09-26 LAB
ABO RH CONFIRMATION: SIGNIFICANT CHANGE UP
BASE EXCESS BLDA CALC-SCNC: -2.3 MMOL/L — LOW (ref -2–2)
BLD GP AB SCN SERPL QL: SIGNIFICANT CHANGE UP
BLOOD GAS COMMENTS ARTERIAL: SIGNIFICANT CHANGE UP
HCO3 BLDA-SCNC: 24 MMOL/L — SIGNIFICANT CHANGE UP (ref 23–27)
HOROWITZ INDEX BLDA+IHG-RTO: 50 — SIGNIFICANT CHANGE UP
PCO2 BLDA: 47 MMHG — HIGH (ref 32–46)
PH BLDA: 7.32 — LOW (ref 7.35–7.45)
PO2 BLDA: 74 MMHG — SIGNIFICANT CHANGE UP (ref 74–108)
SAO2 % BLDA: 93 % — SIGNIFICANT CHANGE UP (ref 92–96)

## 2017-09-26 PROCEDURE — 99291 CRITICAL CARE FIRST HOUR: CPT

## 2017-09-26 PROCEDURE — 88300 SURGICAL PATH GROSS: CPT | Mod: 26

## 2017-09-26 PROCEDURE — 71010: CPT | Mod: 26

## 2017-09-26 RX ORDER — FUROSEMIDE 40 MG
1 TABLET ORAL
Qty: 0 | Refills: 0 | COMMUNITY

## 2017-09-26 RX ORDER — ASPIRIN/CALCIUM CARB/MAGNESIUM 324 MG
81 TABLET ORAL DAILY
Qty: 0 | Refills: 0 | Status: DISCONTINUED | OUTPATIENT
Start: 2017-09-26 | End: 2017-09-27

## 2017-09-26 RX ORDER — LOSARTAN POTASSIUM 100 MG/1
25 TABLET, FILM COATED ORAL DAILY
Qty: 0 | Refills: 0 | Status: DISCONTINUED | OUTPATIENT
Start: 2017-09-26 | End: 2017-09-27

## 2017-09-26 RX ORDER — MULTIVIT-MIN/FERROUS GLUCONATE 9 MG/15 ML
1 LIQUID (ML) ORAL
Qty: 0 | Refills: 0 | COMMUNITY

## 2017-09-26 RX ORDER — ONDANSETRON 8 MG/1
4 TABLET, FILM COATED ORAL ONCE
Qty: 0 | Refills: 0 | Status: DISCONTINUED | OUTPATIENT
Start: 2017-09-26 | End: 2017-09-26

## 2017-09-26 RX ORDER — DEXTROSE 50 % IN WATER 50 %
25 SYRINGE (ML) INTRAVENOUS ONCE
Qty: 0 | Refills: 0 | Status: DISCONTINUED | OUTPATIENT
Start: 2017-09-26 | End: 2017-09-27

## 2017-09-26 RX ORDER — ACETAMINOPHEN 500 MG
1000 TABLET ORAL ONCE
Qty: 0 | Refills: 0 | Status: COMPLETED | OUTPATIENT
Start: 2017-09-26 | End: 2017-09-26

## 2017-09-26 RX ORDER — ACETAMINOPHEN WITH CODEINE 300MG-30MG
1 TABLET ORAL EVERY 4 HOURS
Qty: 0 | Refills: 0 | Status: DISCONTINUED | OUTPATIENT
Start: 2017-09-26 | End: 2017-09-27

## 2017-09-26 RX ORDER — DEXTROSE 50 % IN WATER 50 %
12.5 SYRINGE (ML) INTRAVENOUS ONCE
Qty: 0 | Refills: 0 | Status: DISCONTINUED | OUTPATIENT
Start: 2017-09-26 | End: 2017-09-27

## 2017-09-26 RX ORDER — ALPRAZOLAM 0.25 MG
0.25 TABLET ORAL ONCE
Qty: 0 | Refills: 0 | Status: DISCONTINUED | OUTPATIENT
Start: 2017-09-26 | End: 2017-09-26

## 2017-09-26 RX ORDER — INSULIN LISPRO 100/ML
VIAL (ML) SUBCUTANEOUS
Qty: 0 | Refills: 0 | Status: DISCONTINUED | OUTPATIENT
Start: 2017-09-26 | End: 2017-09-27

## 2017-09-26 RX ORDER — CIPROFLOXACIN LACTATE 400MG/40ML
1 VIAL (ML) INTRAVENOUS
Qty: 6 | Refills: 0 | OUTPATIENT
Start: 2017-09-26

## 2017-09-26 RX ORDER — SODIUM CHLORIDE 9 MG/ML
1000 INJECTION, SOLUTION INTRAVENOUS
Qty: 0 | Refills: 0 | Status: DISCONTINUED | OUTPATIENT
Start: 2017-09-26 | End: 2017-09-27

## 2017-09-26 RX ORDER — GLUCAGON INJECTION, SOLUTION 0.5 MG/.1ML
1 INJECTION, SOLUTION SUBCUTANEOUS ONCE
Qty: 0 | Refills: 0 | Status: DISCONTINUED | OUTPATIENT
Start: 2017-09-26 | End: 2017-09-27

## 2017-09-26 RX ORDER — FUROSEMIDE 40 MG
80 TABLET ORAL DAILY
Qty: 0 | Refills: 0 | Status: DISCONTINUED | OUTPATIENT
Start: 2017-09-26 | End: 2017-09-27

## 2017-09-26 RX ORDER — MORPHINE SULFATE 50 MG/1
2 CAPSULE, EXTENDED RELEASE ORAL EVERY 6 HOURS
Qty: 0 | Refills: 0 | Status: DISCONTINUED | OUTPATIENT
Start: 2017-09-26 | End: 2017-09-27

## 2017-09-26 RX ORDER — TAMSULOSIN HYDROCHLORIDE 0.4 MG/1
0.4 CAPSULE ORAL AT BEDTIME
Qty: 0 | Refills: 0 | Status: DISCONTINUED | OUTPATIENT
Start: 2017-09-26 | End: 2017-09-27

## 2017-09-26 RX ORDER — DOCUSATE SODIUM 100 MG
0 CAPSULE ORAL
Qty: 0 | Refills: 0 | COMMUNITY

## 2017-09-26 RX ORDER — CIPROFLOXACIN LACTATE 400MG/40ML
1 VIAL (ML) INTRAVENOUS
Qty: 0 | Refills: 0 | COMMUNITY
Start: 2017-09-26

## 2017-09-26 RX ORDER — ROSUVASTATIN CALCIUM 5 MG/1
1 TABLET ORAL
Qty: 0 | Refills: 0 | COMMUNITY

## 2017-09-26 RX ORDER — IPRATROPIUM/ALBUTEROL SULFATE 18-103MCG
3 AEROSOL WITH ADAPTER (GRAM) INHALATION EVERY 6 HOURS
Qty: 0 | Refills: 0 | Status: DISCONTINUED | OUTPATIENT
Start: 2017-09-26 | End: 2017-09-27

## 2017-09-26 RX ORDER — CIPROFLOXACIN LACTATE 400MG/40ML
500 VIAL (ML) INTRAVENOUS EVERY 12 HOURS
Qty: 0 | Refills: 0 | Status: DISCONTINUED | OUTPATIENT
Start: 2017-09-26 | End: 2017-09-27

## 2017-09-26 RX ORDER — ATORVASTATIN CALCIUM 80 MG/1
20 TABLET, FILM COATED ORAL AT BEDTIME
Qty: 0 | Refills: 0 | Status: DISCONTINUED | OUTPATIENT
Start: 2017-09-26 | End: 2017-09-27

## 2017-09-26 RX ORDER — FINASTERIDE 5 MG/1
5 TABLET, FILM COATED ORAL DAILY
Qty: 0 | Refills: 0 | Status: DISCONTINUED | OUTPATIENT
Start: 2017-09-26 | End: 2017-09-27

## 2017-09-26 RX ORDER — FUROSEMIDE 40 MG
20 TABLET ORAL ONCE
Qty: 0 | Refills: 0 | Status: COMPLETED | OUTPATIENT
Start: 2017-09-26 | End: 2017-09-26

## 2017-09-26 RX ORDER — METFORMIN HYDROCHLORIDE 850 MG/1
1 TABLET ORAL
Qty: 0 | Refills: 0 | COMMUNITY

## 2017-09-26 RX ORDER — DOCUSATE SODIUM 100 MG
100 CAPSULE ORAL DAILY
Qty: 0 | Refills: 0 | Status: DISCONTINUED | OUTPATIENT
Start: 2017-09-26 | End: 2017-09-27

## 2017-09-26 RX ORDER — DEXTROSE 50 % IN WATER 50 %
1 SYRINGE (ML) INTRAVENOUS ONCE
Qty: 0 | Refills: 0 | Status: DISCONTINUED | OUTPATIENT
Start: 2017-09-26 | End: 2017-09-27

## 2017-09-26 RX ORDER — METFORMIN HYDROCHLORIDE 850 MG/1
500 TABLET ORAL
Qty: 0 | Refills: 0 | Status: DISCONTINUED | OUTPATIENT
Start: 2017-09-26 | End: 2017-09-26

## 2017-09-26 RX ORDER — CARVEDILOL PHOSPHATE 80 MG/1
25 CAPSULE, EXTENDED RELEASE ORAL EVERY 12 HOURS
Qty: 0 | Refills: 0 | Status: DISCONTINUED | OUTPATIENT
Start: 2017-09-26 | End: 2017-09-27

## 2017-09-26 RX ADMIN — SODIUM CHLORIDE 3 MILLILITER(S): 9 INJECTION INTRAMUSCULAR; INTRAVENOUS; SUBCUTANEOUS at 07:22

## 2017-09-26 RX ADMIN — SODIUM CHLORIDE 3 MILLILITER(S): 9 INJECTION INTRAMUSCULAR; INTRAVENOUS; SUBCUTANEOUS at 23:29

## 2017-09-26 RX ADMIN — Medication 20 MILLIGRAM(S): at 19:40

## 2017-09-26 RX ADMIN — Medication 20 MILLIGRAM(S): at 19:25

## 2017-09-26 RX ADMIN — Medication 125 MILLIGRAM(S): at 21:59

## 2017-09-26 RX ADMIN — Medication 400 MILLIGRAM(S): at 18:11

## 2017-09-26 RX ADMIN — TAMSULOSIN HYDROCHLORIDE 0.4 MILLIGRAM(S): 0.4 CAPSULE ORAL at 23:28

## 2017-09-26 RX ADMIN — Medication 1000 MILLIGRAM(S): at 18:30

## 2017-09-26 RX ADMIN — Medication 40 MILLIGRAM(S): at 23:28

## 2017-09-26 RX ADMIN — Medication 0.25 MILLIGRAM(S): at 21:56

## 2017-09-26 NOTE — BRIEF OPERATIVE NOTE - PROCEDURE
<<-----Click on this checkbox to enter Procedure Cystolitholapaxy of large calculus  09/26/2017    Active  [id number]

## 2017-09-26 NOTE — CHART NOTE - NSCHARTNOTEFT_GEN_A_CORE
D/w Dr. Romo, Anesthesiology    Pt with h/o BENITO and currently with saturations in low 90s. Recommendation for patient to stay overnight for observation considering duration of surgery as well as medical history. Pt to be admitted to N telemetry with continuous O2 monitoring.

## 2017-09-26 NOTE — ASU DISCHARGE PLAN (ADULT/PEDIATRIC). - MEDICATION SUMMARY - MEDICATIONS TO TAKE
I will START or STAY ON the medications listed below when I get home from the hospital:    finasteride 5 mg oral tablet  -- 1 tab(s) by mouth once a day  -- Indication: For .    aspirin 81 mg oral delayed release tablet  -- 1 tab(s) by mouth once a day  -- Indication: For .    losartan 25 mg oral tablet  -- 1 tab(s) by mouth once a day  -- Indication: For .    tamsulosin 0.4 mg oral capsule  -- 1 cap(s) by mouth once a day (at bedtime)  -- Indication: For .    Glucophage 500 mg oral tablet  -- 1 tab(s) by mouth 2 times a day  -- Indication: For .    Crestor 5 mg oral tablet  -- 1 tab(s) by mouth once a day (at bedtime)  -- Indication: For .    carvedilol 25 mg oral tablet  -- 1 tab(s) by mouth every 12 hours  -- Indication: For .    furosemide 80 mg oral tablet  -- 1 tab(s) by mouth once a day  -- Indication: For .    docusate sodium 100 mg oral capsule  --  by mouth , As Needed  -- Indication: For .    ciprofloxacin 500 mg oral tablet  -- 1 tab(s) by mouth every 12 hours  -- Indication: For antibiotics    Centrum Silver oral tablet  -- 1 tab(s) by mouth once a day  -- Indication: For .

## 2017-09-26 NOTE — CONSULT NOTE ADULT - SUBJECTIVE AND OBJECTIVE BOX
Patient is a 71y old  Male who presents with a chief complaint of     Initial HPI on admission:  HPI: 71 yr old male with history of CABG 2012 one vessel CHF (EF 25%), DM HTN Obese former Smoker presents with BPH and BPH. Pt had urinary retention Aug 2017 went to ER at [facility] had catheterization discharged on the 31 of August and seen by urologist. In the office visit a cystoscope done revealed bladder stones and an enlarged prostate went home with a catheter and removed several days later. Pt schedule for cystoscopy litholapaxy and transurethral resection of prostate with bipolar rectoscopy.     BRIEF HOSPITAL COURSE: pt was taken to the OR today large bladder stone. Pt had litholapaxy, laser lithotripsy and TURP. ICU was called due to worsening SOB. On exam pt with good airway movement bilaterally.     PAST MEDICAL & SURGICAL HISTORY:  ·	Sleep apnea in adult: not diagnosed  ·	Cough in adult patient  ·	Neuropathy  ·	Bladder calculus  ·	BPH without obstruction/lower urinary tract symptoms  ·	History of hypertension  ·	DM (diabetes mellitus)  ·	BPH (benign prostatic hyperplasia)  ·	H/O hemorrhoids  ·	CHF (congestive heart failure) EF  ·	Hyperlipidemia  ·	MI (myocardial infarction)  ·	CAD (coronary artery disease)  ·	Active smoker: quit in june 2012  ·	History of open heart surgery  ·	Hx of tonsillectomy  ·	Renal stone: surgically removed    Allergies: No Known Allergies    Intolerances    FAMILY HISTORY:  Family history of chronic congestive heart failure (Mother)    Social history reviewed: former smoker 1-2 PPD for 40 years. Quit 5 years ago.     Review of Systems:    CONSTITUTIONAL: No fever, chills, or fatigue  EYES: No eye pain, visual disturbances, or discharge  ENMT:  No difficulty hearing, tinnitus, vertigo; No sinus or throat pain  NECK: No pain or stiffness  RESPIRATORY: No cough, wheezing, chills or hemoptysis; Shortness of breath  CARDIOVASCULAR: No chest pain, palpitations, dizziness, or leg swelling  GASTROINTESTINAL: No abdominal or epigastric pain. No nausea, vomiting, or hematemesis; No diarrhea or constipation. No melena or hematochezia.  GENITOURINARY: No dysuria, frequency, hematuria, or incontinence  NEUROLOGICAL: No headaches, memory loss, loss of strength, numbness, or tremors  SKIN: No itching, burning, rashes, or lesions   MUSCULOSKELETAL: No joint pain or swelling; No muscle, back, or extremity pain  PSYCHIATRIC: No depression, anxiety, mood swings, or difficulty sleeping    Medications:  ·	sodium chloride 0.9% lock flush 3 milliLiter(s) IV Push every 8 hours  ·	ondansetron Injectable 4 milliGRAM(s) IV Push once PRN  ·	ciprofloxacin     Tablet 500 milliGRAM(s) Oral every 12 hours  ·	acetaminophen 300 mG/codeine 30 mG 1 Tablet(s) Oral every 4 hours PRN  ·	morphine  - Injectable 2 milliGRAM(s) IV Push every 6 hours PRN  ·	finasteride 5 milliGRAM(s) Oral daily  ·	aspirin enteric coated 81 milliGRAM(s) Oral daily  ·	losartan 25 milliGRAM(s) Oral daily  ·	tamsulosin 0.4 milliGRAM(s) Oral at bedtime  ·	atorvastatin 20 milliGRAM(s) Oral at bedtime  ·	carvedilol 25 milliGRAM(s) Oral every 12 hours  ·	furosemide    Tablet 80 milliGRAM(s) Oral daily  ·	docusate sodium 100 milliGRAM(s) Oral daily PRN  ·	insulin lispro (HumaLOG) corrective regimen sliding scale   SubCutaneous three times a day before meals  ·	dextrose 5%. 1000 milliLiter(s) IV Continuous <Continuous>  ·	dextrose Gel 1 Dose(s) Oral once PRN  ·	dextrose 50% Injectable 12.5 Gram(s) IV Push once  ·	dextrose 50% Injectable 25 Gram(s) IV Push once  ·	dextrose 50% Injectable 25 Gram(s) IV Push once  ·	glucagon  Injectable 1 milliGRAM(s) IntraMuscular once PRN    vent settings: venti mask 50%    Vital Signs Last 24 Hrs  ·	T(C): 36.9 (26 Sep 2017 16:46), Max: 36.9 (26 Sep 2017 16:46)  ·	T(F): 98.4 (26 Sep 2017 16:46), Max: 98.4 (26 Sep 2017 16:46)  ·	HR: 102 (26 Sep 2017 19:30) (64 - 102)  ·	BP: 128/78 (26 Sep 2017 19:30) (108/61 - 143/73)  ·	BP(mean): 89 (26 Sep 2017 19:30) (79 - 99)  ·	RR: 22 (26 Sep 2017 19:30) (14 - 25)  ·	SpO2: 97% (26 Sep 2017 19:30) (94% - 99%)    ABG - ( 26 Sep 2017 20:23 )  pH: 7.32  /  pCO2: 47    /  pO2: 74    / HCO3: 24    / Base Excess: -2.3  /  SaO2: 93          CAPILLARY BLOOD GLUCOSE  145 (26 Sep 2017 07:12)    CULTURES:    Physical Examination:    General: respiratory distress, obese      HEENT: Pupils equal, reactive to light.  Symmetric.    PULM: Clear to auscultation bilaterally, no significant sputum production    CVS: Regular rate and rhythm, no murmurs, rubs, or gallops    ABD: Soft, nondistended, nontender, normoactive bowel sounds, no masses    EXT: No edema, nontender    SKIN: Warm and well perfused, no rashes noted.    NEURO: Alert, oriented, interactive, nonfocal    RADIOLOGY REVIEWED ***    CRITICAL CARE TIME SPENT: 35 minutes Patient is a 71y old  Male admitted for elective cystoscopy for large stone removal.     Initial HPI on admission:  71 yr old male from home, PMH of CABG (2012 one vessel), HFrEF (EF 25%), DM, HTN, Obese former Smoker. Pt presents with BPH and BPH. Pt had urinary retention Aug 2017 went to ER at [place] had catheterization discharged on the 31 of August and seen by urologist. In the office visit a cystoscope done revealed bladder stones and an enlarged prostate went home with a catheter and removed several days later. Pt schedule for cystoscopy litholapaxy and transurethral resection of prostate with bipolar rectoscopy.     BRIEF HOSPITAL COURSE: pt was taken to the OR today large bladder stone. Pt had litholapaxy, laser lithotripsy and TURP. ICU was called due to worsening SOB. On exam pt with good airway movement bilaterally.     PAST MEDICAL & SURGICAL HISTORY:  ·	Sleep apnea in adult: not diagnosed  ·	Cough in adult patient  ·	Neuropathy  ·	Bladder calculus  ·	BPH without obstruction/lower urinary tract symptoms  ·	History of hypertension  ·	DM (diabetes mellitus)  ·	BPH (benign prostatic hyperplasia)  ·	H/O hemorrhoids  ·	CHF (congestive heart failure) EF  ·	Hyperlipidemia  ·	MI (myocardial infarction)  ·	CAD (coronary artery disease)  ·	Active smoker: quit in june 2012  ·	History of open heart surgery  ·	Hx of tonsillectomy  ·	Renal stone: surgically removed    Allergies: No Known Allergies    Intolerances    FAMILY HISTORY:  Family history of chronic congestive heart failure (Mother)    Social history reviewed: former smoker 1-2 PPD for 40 years. Quit 5 years ago.     Review of Systems:    CONSTITUTIONAL: No fever, chills, or fatigue  EYES: No eye pain, visual disturbances, or discharge  ENMT:  No difficulty hearing, tinnitus, vertigo; No sinus or throat pain  NECK: No pain or stiffness  RESPIRATORY: No cough, wheezing, chills or hemoptysis; Shortness of breath  CARDIOVASCULAR: No chest pain, palpitations, dizziness, or leg swelling  GASTROINTESTINAL: No abdominal or epigastric pain. No nausea, vomiting, or hematemesis; No diarrhea or constipation. No melena or hematochezia.  GENITOURINARY: No dysuria, frequency, hematuria, or incontinence  NEUROLOGICAL: No headaches, memory loss, loss of strength, numbness, or tremors  SKIN: No itching, burning, rashes, or lesions   MUSCULOSKELETAL: No joint pain or swelling; No muscle, back, or extremity pain  PSYCHIATRIC: No depression, anxiety, mood swings, or difficulty sleeping    Medications:  ·	sodium chloride 0.9% lock flush 3 milliLiter(s) IV Push every 8 hours  ·	ondansetron Injectable 4 milliGRAM(s) IV Push once PRN  ·	ciprofloxacin     Tablet 500 milliGRAM(s) Oral every 12 hours  ·	acetaminophen 300 mG/codeine 30 mG 1 Tablet(s) Oral every 4 hours PRN  ·	morphine  - Injectable 2 milliGRAM(s) IV Push every 6 hours PRN  ·	finasteride 5 milliGRAM(s) Oral daily  ·	aspirin enteric coated 81 milliGRAM(s) Oral daily  ·	losartan 25 milliGRAM(s) Oral daily  ·	tamsulosin 0.4 milliGRAM(s) Oral at bedtime  ·	atorvastatin 20 milliGRAM(s) Oral at bedtime  ·	carvedilol 25 milliGRAM(s) Oral every 12 hours  ·	furosemide    Tablet 80 milliGRAM(s) Oral daily  ·	docusate sodium 100 milliGRAM(s) Oral daily PRN  ·	insulin lispro (HumaLOG) corrective regimen sliding scale   SubCutaneous three times a day before meals  ·	dextrose 5%. 1000 milliLiter(s) IV Continuous <Continuous>  ·	dextrose Gel 1 Dose(s) Oral once PRN  ·	dextrose 50% Injectable 12.5 Gram(s) IV Push once  ·	dextrose 50% Injectable 25 Gram(s) IV Push once  ·	dextrose 50% Injectable 25 Gram(s) IV Push once  ·	glucagon  Injectable 1 milliGRAM(s) IntraMuscular once PRN    vent settings: venti mask 50%    Vital Signs Last 24 Hrs  ·	T(C): 36.9 (26 Sep 2017 16:46), Max: 36.9 (26 Sep 2017 16:46)  ·	T(F): 98.4 (26 Sep 2017 16:46), Max: 98.4 (26 Sep 2017 16:46)  ·	HR: 102 (26 Sep 2017 19:30) (64 - 102)  ·	BP: 128/78 (26 Sep 2017 19:30) (108/61 - 143/73)  ·	BP(mean): 89 (26 Sep 2017 19:30) (79 - 99)  ·	RR: 22 (26 Sep 2017 19:30) (14 - 25)  ·	SpO2: 97% (26 Sep 2017 19:30) (94% - 99%)    ABG - ( 26 Sep 2017 20:23 )  pH: 7.32  /  pCO2: 47    /  pO2: 74    / HCO3: 24    / Base Excess: -2.3  /  SaO2: 93          CAPILLARY BLOOD GLUCOSE  145 (26 Sep 2017 07:12)    CULTURES:    Physical Examination:    General: respiratory distress, obese    HEENT: Pupils equal, reactive to light.  Symmetric.  PULM: Clear to auscultation bilaterally, no significant sputum production  CVS: Regular rate and rhythm, no murmurs, rubs, or gallops  ABD: Soft, nondistended, nontender, normoactive bowel sounds, no masses  EXT: No edema, nontender  SKIN: Warm and well perfused, no rashes noted.  NEURO: Alert, oriented, interactive, nonfocal    RADIOLOGY REVIEWED   CXR clear with no acute infiltrate     CRITICAL CARE TIME SPENT: 35 minutes

## 2017-09-26 NOTE — CONSULT NOTE ADULT - ATTENDING COMMENTS
Patient is a 72 y/o male with PMH of CAD s/p CABG, COPD, possible obstructive sleep apnea who had a large stone in the ureter/bladder which was removed in a 8 hour procedure. He also had a TURP. Post operatively he began having SOB and tachypnea ( rr -32 ), desaturation as measured via pulse oximetry. ICU consult called by anesthesia after IV lasix given with modest affect. I requested and evaluated an ABG 7.42/47/74 while on a 50 % O2 mask. He had reduced air entry bilaterally without wheezing. He was admitted to ICU due to recurring c/o SOB by the patient. He also has a h/o HF rEF but there is minimal evidence of CHF. Dx- post operative hypoxemia ( paO2/Fio2 = 148 ), respiratory distress. CXR no acute infiltrate. Patient is a 70 y/o male with PMH of CAD s/p CABG, COPD, possible obstructive sleep apnea who had a large stone in the ureter/bladder which was removed in a 8 hour procedure. He also had a TURP. Post operatively he began having SOB and tachypnea ( rr -32 ), desaturation as measured via pulse oximetry. ICU consult called by anesthesia after IV lasix given with modest affect. I requested and evaluated an ABG 7.42/47/74 while on a 50 % O2 mask. He had reduced air entry bilaterally without wheezing. He was admitted to ICU due to recurring c/o SOB by the patient. He also has a h/o HF rEF and improved somewhat with lasix. . Dx- post operative hypoxemia ( paO2/Fio2 = 148 ), fluid overload. CXR increased interstitial markings bilaterally.

## 2017-09-26 NOTE — CONSULT NOTE ADULT - PROBLEM SELECTOR RECOMMENDATION 9
pt developed SOB after post op worsening SOB   pt was given 40mg of Lasix IV x 1   pt reported having trouble breathing, on exam pt with adequate air movement  CXR with no acute infiltrates   ABG showing 7.32/47/74/24/50%  likely 2/2 COPD exacerbation   c/w bronchodilators   c/w Solumedrol and mariela down as tolerated pt developed SOB after post op worsening SOB   pt was given 40mg of Lasix IV x 1   pt reported having trouble breathing, on exam pt with adequate air movement  CXR with pulmonary congestion   ABG showing 7.32/47/74/24/50%  likely 2/2 CHF exacerbation   c/w bronchodilators   c/w Lasix 80mg IV daily   c/w negative balance   c/w strict I & O and negative balance

## 2017-09-27 VITALS
SYSTOLIC BLOOD PRESSURE: 110 MMHG | DIASTOLIC BLOOD PRESSURE: 52 MMHG | HEART RATE: 86 BPM | RESPIRATION RATE: 24 BRPM | OXYGEN SATURATION: 95 %

## 2017-09-27 LAB
24R-OH-CALCIDIOL SERPL-MCNC: 34.1 NG/ML — SIGNIFICANT CHANGE UP (ref 30–80)
ANION GAP SERPL CALC-SCNC: 8 MMOL/L — SIGNIFICANT CHANGE UP (ref 5–17)
BUN SERPL-MCNC: 27 MG/DL — HIGH (ref 7–18)
CALCIUM SERPL-MCNC: 9.2 MG/DL — SIGNIFICANT CHANGE UP (ref 8.4–10.5)
CHLORIDE SERPL-SCNC: 105 MMOL/L — SIGNIFICANT CHANGE UP (ref 96–108)
CHOLEST SERPL-MCNC: 134 MG/DL — SIGNIFICANT CHANGE UP (ref 10–199)
CO2 SERPL-SCNC: 25 MMOL/L — SIGNIFICANT CHANGE UP (ref 22–31)
CREAT SERPL-MCNC: 1.52 MG/DL — HIGH (ref 0.5–1.3)
FOLATE SERPL-MCNC: >20 NG/ML — SIGNIFICANT CHANGE UP (ref 4.8–24.2)
GLUCOSE SERPL-MCNC: 156 MG/DL — HIGH (ref 70–99)
HBA1C BLD-MCNC: 7.8 % — HIGH (ref 4–5.6)
HCT VFR BLD CALC: 45.2 % — SIGNIFICANT CHANGE UP (ref 39–50)
HDLC SERPL-MCNC: 56 MG/DL — SIGNIFICANT CHANGE UP (ref 40–125)
HGB BLD-MCNC: 14.8 G/DL — SIGNIFICANT CHANGE UP (ref 13–17)
INR BLD: 1.14 RATIO — SIGNIFICANT CHANGE UP (ref 0.88–1.16)
LIPID PNL WITH DIRECT LDL SERPL: 63 MG/DL — SIGNIFICANT CHANGE UP
MAGNESIUM SERPL-MCNC: 2.1 MG/DL — SIGNIFICANT CHANGE UP (ref 1.6–2.6)
MCHC RBC-ENTMCNC: 29.9 PG — SIGNIFICANT CHANGE UP (ref 27–34)
MCHC RBC-ENTMCNC: 32.7 GM/DL — SIGNIFICANT CHANGE UP (ref 32–36)
MCV RBC AUTO: 91.3 FL — SIGNIFICANT CHANGE UP (ref 80–100)
PHOSPHATE SERPL-MCNC: 3.5 MG/DL — SIGNIFICANT CHANGE UP (ref 2.5–4.5)
PLATELET # BLD AUTO: 232 K/UL — SIGNIFICANT CHANGE UP (ref 150–400)
POTASSIUM SERPL-MCNC: 4.7 MMOL/L — SIGNIFICANT CHANGE UP (ref 3.5–5.3)
POTASSIUM SERPL-SCNC: 4.7 MMOL/L — SIGNIFICANT CHANGE UP (ref 3.5–5.3)
PROTHROM AB SERPL-ACNC: 12.5 SEC — SIGNIFICANT CHANGE UP (ref 9.8–12.7)
RBC # BLD: 4.95 M/UL — SIGNIFICANT CHANGE UP (ref 4.2–5.8)
RBC # FLD: 12.8 % — SIGNIFICANT CHANGE UP (ref 10.3–14.5)
SODIUM SERPL-SCNC: 138 MMOL/L — SIGNIFICANT CHANGE UP (ref 135–145)
TOTAL CHOLESTEROL/HDL RATIO MEASUREMENT: 2.4 RATIO — LOW (ref 3.4–9.6)
TRIGL SERPL-MCNC: 77 MG/DL — SIGNIFICANT CHANGE UP (ref 10–149)
TSH SERPL-MCNC: 1.11 UU/ML — SIGNIFICANT CHANGE UP (ref 0.34–4.82)
VIT B12 SERPL-MCNC: 602 PG/ML — SIGNIFICANT CHANGE UP (ref 243–894)
WBC # BLD: 14.5 K/UL — HIGH (ref 3.8–10.5)
WBC # FLD AUTO: 14.5 K/UL — HIGH (ref 3.8–10.5)

## 2017-09-27 PROCEDURE — 99024 POSTOP FOLLOW-UP VISIT: CPT

## 2017-09-27 PROCEDURE — 86850 RBC ANTIBODY SCREEN: CPT

## 2017-09-27 PROCEDURE — 80061 LIPID PANEL: CPT

## 2017-09-27 PROCEDURE — 85027 COMPLETE CBC AUTOMATED: CPT

## 2017-09-27 PROCEDURE — 82306 VITAMIN D 25 HYDROXY: CPT

## 2017-09-27 PROCEDURE — 82365 CALCULUS SPECTROSCOPY: CPT

## 2017-09-27 PROCEDURE — C1889: CPT

## 2017-09-27 PROCEDURE — 82746 ASSAY OF FOLIC ACID SERUM: CPT

## 2017-09-27 PROCEDURE — 84100 ASSAY OF PHOSPHORUS: CPT

## 2017-09-27 PROCEDURE — 85610 PROTHROMBIN TIME: CPT

## 2017-09-27 PROCEDURE — 71010: CPT | Mod: 26

## 2017-09-27 PROCEDURE — 88300 SURGICAL PATH GROSS: CPT

## 2017-09-27 PROCEDURE — 83036 HEMOGLOBIN GLYCOSYLATED A1C: CPT

## 2017-09-27 PROCEDURE — 82607 VITAMIN B-12: CPT

## 2017-09-27 PROCEDURE — 94640 AIRWAY INHALATION TREATMENT: CPT

## 2017-09-27 PROCEDURE — 82803 BLOOD GASES ANY COMBINATION: CPT

## 2017-09-27 PROCEDURE — 86900 BLOOD TYPING SEROLOGIC ABO: CPT

## 2017-09-27 PROCEDURE — 86901 BLOOD TYPING SEROLOGIC RH(D): CPT

## 2017-09-27 PROCEDURE — 71045 X-RAY EXAM CHEST 1 VIEW: CPT

## 2017-09-27 PROCEDURE — 86920 COMPATIBILITY TEST SPIN: CPT

## 2017-09-27 PROCEDURE — 83735 ASSAY OF MAGNESIUM: CPT

## 2017-09-27 PROCEDURE — 80048 BASIC METABOLIC PNL TOTAL CA: CPT

## 2017-09-27 PROCEDURE — 84443 ASSAY THYROID STIM HORMONE: CPT

## 2017-09-27 PROCEDURE — 93005 ELECTROCARDIOGRAM TRACING: CPT

## 2017-09-27 RX ORDER — HEPARIN SODIUM 5000 [USP'U]/ML
5000 INJECTION INTRAVENOUS; SUBCUTANEOUS EVERY 8 HOURS
Qty: 0 | Refills: 0 | Status: DISCONTINUED | OUTPATIENT
Start: 2017-09-27 | End: 2017-09-27

## 2017-09-27 RX ORDER — FUROSEMIDE 40 MG
80 TABLET ORAL DAILY
Qty: 0 | Refills: 0 | Status: DISCONTINUED | OUTPATIENT
Start: 2017-09-27 | End: 2017-09-27

## 2017-09-27 RX ORDER — PANTOPRAZOLE SODIUM 20 MG/1
40 TABLET, DELAYED RELEASE ORAL
Qty: 0 | Refills: 0 | Status: DISCONTINUED | OUTPATIENT
Start: 2017-09-27 | End: 2017-09-27

## 2017-09-27 RX ORDER — IPRATROPIUM/ALBUTEROL SULFATE 18-103MCG
3 AEROSOL WITH ADAPTER (GRAM) INHALATION EVERY 6 HOURS
Qty: 0 | Refills: 0 | Status: DISCONTINUED | OUTPATIENT
Start: 2017-09-27 | End: 2017-09-27

## 2017-09-27 RX ORDER — IPRATROPIUM/ALBUTEROL SULFATE 18-103MCG
3 AEROSOL WITH ADAPTER (GRAM) INHALATION ONCE
Qty: 0 | Refills: 0 | Status: COMPLETED | OUTPATIENT
Start: 2017-09-27 | End: 2017-09-27

## 2017-09-27 RX ORDER — OXYBUTYNIN CHLORIDE 5 MG
1 TABLET ORAL
Qty: 10 | Refills: 0 | OUTPATIENT
Start: 2017-09-27 | End: 2017-10-02

## 2017-09-27 RX ADMIN — PANTOPRAZOLE SODIUM 40 MILLIGRAM(S): 20 TABLET, DELAYED RELEASE ORAL at 12:15

## 2017-09-27 RX ADMIN — Medication 80 MILLIGRAM(S): at 05:30

## 2017-09-27 RX ADMIN — Medication 3 MILLILITER(S): at 08:22

## 2017-09-27 RX ADMIN — Medication 2: at 06:45

## 2017-09-27 RX ADMIN — Medication 4: at 11:00

## 2017-09-27 RX ADMIN — Medication 81 MILLIGRAM(S): at 12:15

## 2017-09-27 RX ADMIN — Medication 1 TABLET(S): at 10:35

## 2017-09-27 RX ADMIN — SODIUM CHLORIDE 3 MILLILITER(S): 9 INJECTION INTRAMUSCULAR; INTRAVENOUS; SUBCUTANEOUS at 14:07

## 2017-09-27 RX ADMIN — CARVEDILOL PHOSPHATE 25 MILLIGRAM(S): 80 CAPSULE, EXTENDED RELEASE ORAL at 05:31

## 2017-09-27 RX ADMIN — Medication 500 MILLIGRAM(S): at 05:31

## 2017-09-27 RX ADMIN — CARVEDILOL PHOSPHATE 25 MILLIGRAM(S): 80 CAPSULE, EXTENDED RELEASE ORAL at 18:04

## 2017-09-27 RX ADMIN — Medication 1 TABLET(S): at 11:00

## 2017-09-27 RX ADMIN — Medication 3 MILLILITER(S): at 15:37

## 2017-09-27 RX ADMIN — LOSARTAN POTASSIUM 25 MILLIGRAM(S): 100 TABLET, FILM COATED ORAL at 05:31

## 2017-09-27 RX ADMIN — Medication 3 MILLILITER(S): at 04:03

## 2017-09-27 RX ADMIN — HEPARIN SODIUM 5000 UNIT(S): 5000 INJECTION INTRAVENOUS; SUBCUTANEOUS at 14:10

## 2017-09-27 RX ADMIN — SODIUM CHLORIDE 3 MILLILITER(S): 9 INJECTION INTRAMUSCULAR; INTRAVENOUS; SUBCUTANEOUS at 06:43

## 2017-09-27 RX ADMIN — Medication 2: at 16:19

## 2017-09-27 RX ADMIN — Medication 500 MILLIGRAM(S): at 18:03

## 2017-09-27 RX ADMIN — FINASTERIDE 5 MILLIGRAM(S): 5 TABLET, FILM COATED ORAL at 12:15

## 2017-09-27 NOTE — PROGRESS NOTE ADULT - ASSESSMENT
S/p Cystolitholapaxy of large calculus  Post-op pulmonary congestion  Clinically improving
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 Pt presents with BPH and BPH. Pt had urinary retention Aug 2017 went to ER at [facility] had catheterization discharged on the 31 of August and seen by urologist. In the office visit a cystoscope done revealed bladder stones and an enlarged prostate went home with a catheter and removed several days later. Pt schedule for cystoscopy litholapaxy and transurethral resection of prostate with bipolar rectoscopy.     BRIEF HOSPITAL COURSE: pt was taken to the OR today large bladder stone. Pt had litholapaxy, laser lithotripsy and TURP. ICU was called due to worsening SOB. On exam pt with good airway movement bilaterally.   Admitted to ICU for respiratory distress due to Worsening CHF/COPD     Problem/Recommendation - 1:  Problem: CHF exacerbation. Recommendation: pt developed SOB after post op worsening SOB   pt was given 40mg of Lasix IV x 1   pt reported having trouble breathing, on exam pt with adequate air movement  CXR with pulmonary congestion   ABG showing 7.32/47/74/24/50%  likely 2/2 CHF exacerbation   I/O: 1106  Repeat CXR shows:  c/w bronchodilators   c/w Lasix 80mg IV daily   c/w negative balance   c/w strict I & O and negative balance. pt developed SOB after post op worsening SOB   pt was given 40mg of Lasix IV x 1   pt reported having trouble breathing, on exam pt with adequate air movement  CXR with no acute infiltrates   ABG showing 7.32/47/74/24/50%  likely 2/2 COPD exacerbation   c/w bronchodilators   c/w Solumedrol and mariela down as tolerated       Problem/Recommendation - 2:  ·  Problem: Type 2 diabetes mellitus without complication, without long-term current use of insulin.  Recommendation: last HbA1C level 7.8  Fs: stable  continue with HSS.      Problem/Recommendation - 3:  ·  Problem: History of hypertension.  Recommendation: BP stable. pt takes Coreg 25mg BID, lasix 80mg daily and losartan 25mg daily  continue with home medications.      Problem/Recommendation - 4:  ·  Problem: Bladder calculus.  Recommendation: continue with post op monitoring   c/w Ciprofloxacin 500mg BID  c/w FC Leaking. As per urology, Irrigation will be done if it worked otherwise will change the catheter.  f/u surgery.      Problem/Recommendation - 5:  ·  Problem: Benign prostatic hyperplasia, unspecified whether lower urinary tract symptoms present.  Recommendation: continue with Flomax 0.5mg daily and finasteride 5mg daily.      Problem/Recommendation - 6:  Problem: Prophylactic measure. Recommendation: c/w DVT and GI ppx.
s/p cystolithalopaxy'  COPD exacerbation with fluid overload post op, now clinically improved with diruersis  catheter was Irrigated easily with starile saline, with good return, and no evidence of retention of urine. No clots or stone fragments retrieved.  likely bladder spasm account for leaking around catheter.    Recommend Ditropan 5mg daily to help with spasm  (Not Ditropan ER but short acting Ditropan)  OOB  continue current meds

## 2017-09-27 NOTE — DISCHARGE NOTE ADULT - HOSPITAL COURSE
Admission HPI:   71 yr old male from home, PMH of CABG (2012 one vessel), HFrEF (EF 25%), DM, HTN, Obese former Smoker. Pt presents with BPH and BPH. Pt had urinary retention Aug 2017 went to ER at [place] had catheterization discharged on the 31 of August and seen by urologist. In the office visit a cystoscope done revealed bladder stones and an enlarged prostate went home with a catheter and removed several days later. Pt schedule for cystoscopy litholapaxy and transurethral resection of prostate with bipolar rectoscopy.     Hospital course:  Pt was taken to the OR today large bladder stone. Pt had litholapaxy, laser lithotripsy and TURP. ICU was called due to worsening SOB. On exam pt with good airway movement bilaterally.Admitted to ICU for respiratory distress due to Worsening CHF/COPD. CHF exacerbation. Pt developed SOB after post op worsening SOB s/p 40mg of Lasix IV x 1 pt reported having trouble breathing, on exam pt with adequate air movement. CXR with pulmonary congestion  ABG showing 7.32/47/74/24/50%. Patient was placed on lasix 80mg IV, and strict I/O was done. with 1106 urine output, bronchodilators. Patient clinically improved in morning. Repeated CXR improved.  For his DM patient FS stable.   Patient is stable to be discharged,  Patient will follow up with his PCP, Urologist as outpatient. Admission HPI:   71 yr old male from home, PMH of CABG (2012 one vessel), HFrEF (EF 25%), DM, HTN, Obese former Smoker. Pt presents with BPH and BPH. Pt had urinary retention Aug 2017 went to ER at [place] had catheterization discharged on the 31 of August and seen by urologist. In the office visit a cystoscope done revealed bladder stones and an enlarged prostate went home with a catheter and removed several days later. Pt schedule for cystoscopy litholapaxy and transurethral resection of prostate with bipolar rectoscopy.     Hospital course:  Pt was taken to the OR for large bladder stone. Pt had litholapaxy, laser lithotripsy and TURP, Patient was given 3 L of fluid. ICU was called due to worsening SOB. Admitted to ICU for respiratory distress due to Worsening CHF/COPD. s/p 40mg of Lasix IV x 1. CXR with pulmonary congestion  ABG showing 7.32/47/74/24/50%. Patient was placed on lasix 80mg IV, and strict I/O was done. with 1106 urine output, bronchodilators. Patient clinically improved in morning. Repeated CXR improved.  For his DM patient FS stable.   Patient is stable to be discharged,  Patient will follow up with his PCP, Urologist as outpatient. Admission HPI:   71 yr old male from home, PMH of CABG (2012 one vessel), HFrEF (EF 25%), DM, HTN, Obese former Smoker. Pt presents with BPH and BPH. Pt had urinary retention Aug 2017 went to ER at [facility] had catheterization discharged on the 31 of August and seen by urologist. In the office visit a cystoscope done revealed bladder stones and an enlarged prostate went home with a catheter and removed several days later. Pt schedule for cystoscopy litholapaxy and transurethral resection of prostate with bipolar rectoscopy.     Hospital course:  Pt was taken to the OR for large bladder stone. Pt had litholapaxy, laser lithotripsy and TURP, Patient was given 3 L of fluid. ICU was called due to worsening SOB. Admitted to ICU for respiratory distress due to Worsening CHF/COPD. s/p 40mg of Lasix IV x 1. CXR with pulmonary congestion  ABG showed 7.32/47/74/24/50%. Patient was placed on lasix 80mg IV, and strict I/O was done. with 1106 urine output, bronchodilators. Patient clinically improved in morning. Repeated CXR improved.    Patient is stable to be discharged,  Patient will follow up with his PCP, Urologist as outpatient.

## 2017-09-27 NOTE — CONSULT NOTE ADULT - SUBJECTIVE AND OBJECTIVE BOX
ARIELLE VIEIRA  MR# [id number]  71yMale        Patient is a 71y old  Male who presents with a chief complaint of admitted for elective cystoscopy for large stone removal. (27 Sep 2017 15:20)      INTERVAL HPI/OVERNIGHT EVENTS:  71 yr old male from home, PMH of CABG (2012 one vessel), HFrEF (EF 25%), DM, HTN, Obese former Smoker, presented with BPH and secondary retention. Pt had urinary retention Aug 2017 at which point went to the ER at [place] and had catheterization and discharged on the 31 of August with outpt follow up with urologist. In the office visit a cystoscopy done, bladder stones and an enlarged prostate was revealed whereby it was decided to send home with a catheter with removal several days later. Pt was also scheduled with litholapaxy and transurethral resection of prostate with bipolar rectoscopy.  Now S/P litholapaxy, laser lithotripsy and TURP however pt was noted with worsening SOB. On CXR pt was noted with pulmonary vascular congestion and thereafter has been admitted to ICU for respiratory distress due to worsening CHF/COPD.      PMH/PSH  As Above    ALLERGIES  No Known Allergies      MEDICATIONS  sodium chloride 0.9% lock flush 3 milliLiter(s) IV Push every 8 hours  ciprofloxacin     Tablet 500 milliGRAM(s) Oral every 12 hours  acetaminophen 300 mG/codeine 30 mG 1 Tablet(s) Oral every 4 hours PRN Moderate Pain (4 - 6)  morphine  - Injectable 2 milliGRAM(s) IV Push every 6 hours PRN Severe Pain  finasteride 5 milliGRAM(s) Oral daily  aspirin enteric coated 81 milliGRAM(s) Oral daily  losartan 25 milliGRAM(s) Oral daily  tamsulosin 0.4 milliGRAM(s) Oral at bedtime  atorvastatin 20 milliGRAM(s) Oral at bedtime  carvedilol 25 milliGRAM(s) Oral every 12 hours  docusate sodium 100 milliGRAM(s) Oral daily PRN Constipation  insulin lispro (HumaLOG) corrective regimen sliding scale   SubCutaneous three times a day before meals  dextrose Gel 1 Dose(s) Oral once PRN Blood Glucose LESS THAN 70 milliGRAM(s)/deciliter  dextrose 50% Injectable 12.5 Gram(s) IV Push once  dextrose 50% Injectable 25 Gram(s) IV Push once  dextrose 50% Injectable 25 Gram(s) IV Push once  glucagon  Injectable 1 milliGRAM(s) IntraMuscular once PRN Glucose LESS THAN 70 milligrams/deciliter  furosemide   Injectable 80 milliGRAM(s) IV Push daily  heparin  Injectable 5000 Unit(s) SubCutaneous every 8 hours  ALBUTerol/ipratropium for Nebulization 3 milliLiter(s) Nebulizer every 6 hours  pantoprazole    Tablet 40 milliGRAM(s) Oral before breakfast              REVIEW OF SYSTEMS:  CONSTITUTIONAL: No fever, weight loss, or fatigue  EYES: No eye pain, visual disturbances, or discharge  ENT:  No difficulty hearing, tinnitus, vertigo; No sinus or throat pain  NECK: No pain or stiffness  RESPIRATORY: No cough, wheezing, chills or hemoptysis; No Shortness of Breath  CARDIOVASCULAR: No chest pain, palpitations, passing out, dizziness, or leg swelling  GASTROINTESTINAL: No abdominal or epigastric pain. No nausea, vomiting, or hematemesis; No diarrhea or constipation. No melena or hematochezia.  GENITOURINARY: No dysuria, frequency, hematuria, or incontinence  NEUROLOGICAL: No headaches, memory loss, loss of strength, numbness, or tremors  SKIN: No itching, burning, rashes, or lesions   LYMPH Nodes: No enlarged glands  ENDOCRINE: No heat or cold intolerance; No hair loss  MUSCULOSKELETAL: No joint pain or swelling; No muscle, back, or extremity pain  PSYCHIATRIC: No depression, anxiety, mood swings, or difficulty sleeping  HEME/LYMPH: No easy bruising, or bleeding gums  ALLERGY AND IMMUNOLOGIC: No hives or eczema	    [ ] All others negative	  [ ] Unable to obtain      T(C): 36.8 (09-27-17 @ 15:54), Max: 36.8 (09-27-17 @ 08:00)  T(F): 98.2 (09-27-17 @ 15:54), Max: 98.2 (09-27-17 @ 08:00)  HR: 86 (09-27-17 @ 18:00) (67 - 109)  BP: 110/52 (09-27-17 @ 18:00) (100/57 - 143/81)  RR: 24 (09-27-17 @ 18:00) (15 - 35)  SpO2: 95% (09-27-17 @ 18:00) (87% - 100%)  Wt(kg): --    Weight (kg): 124.6 (09-26 @ 22:31)  I&O's Summary    26 Sep 2017 07:01  -  27 Sep 2017 07:00  --------------------------------------------------------  IN: 1775 mL / OUT: 669 mL / NET: 1106 mL    27 Sep 2017 07:01  -  27 Sep 2017 18:20  --------------------------------------------------------  IN: 700 mL / OUT: 1180 mL / NET: -480 mL          PHYSICAL EXAM:  A X O x  HEAD:  Atraumatic, Normocephalic  EYES: EOMI, PERRLA, conjunctiva and sclera clear  NECK: Supple, No JVD, Normal thyroid  Resp: CTAB, No crackles, wheezing,   CVS: Regular rate and rhythm; No discernable murmurs, rubs, or gallops  ABD: Soft, Nontender, Nondistended; Bowel sounds present  EXTREMITIES:  2+ Peripheral Pulses, No edema  LYMPH: No dicernable lymphadenopathy noted  GENERAL: NAD, well-groomed, well-developed      LABS:                        14.8   14.5  )-----------( 232      ( 27 Sep 2017 04:03 )             45.2     09-27    138  |  105  |  27<H>  ----------------------------<  156<H>  4.7   |  25  |  1.52<H>    Ca    9.2      27 Sep 2017 04:03  Phos  3.5     09-27  Mg     2.1     09-27      PT/INR - ( 27 Sep 2017 04:03 )   PT: 12.5 sec;   INR: 1.14 ratio             CAPILLARY BLOOD GLUCOSE  182 (27 Sep 2017 16:00)  213 (27 Sep 2017 11:00)  156 (27 Sep 2017 06:00)  209 (26 Sep 2017 21:15)          Troponins:  ProBNP:  Lipid Profile:   HgA1c:  TSH:     ABG - ( 26 Sep 2017 20:23 )  pH: 7.32  /  pCO2: 47    /  pO2: 74    / HCO3: 24    / Base Excess: -2.3  /  SaO2: 93                    RADIOLOGY & ADDITIONAL TESTS:    Imaging Personally Reviewed:  [ ] YES  [ ] NO      Consultant(s) Notes Reviewed:  [x ] YES  [ ] NO    Care Discussed with Consultants/Other Providers [ x] YES  [ ] NO          PAST MEDICAL & SURGICAL HISTORY:  Sleep apnea in adult: not diagnosed  Cough in adult patient  Neuropathy  Bladder calculus  BPH without obstruction/lower urinary tract symptoms  History of hypertension  DM (diabetes mellitus)  BPH (benign prostatic hyperplasia)  H/O hemorrhoids  CHF (congestive heart failure)  Hyperlipidemia  MI (myocardial infarction)  CAD (coronary artery disease)  Active smoker: quit in june 2012  History of open heart surgery  Hx of tonsillectomy  Renal stone: surgically removed        Benign prostatic hyperplasia with lower urinary tract symptoms: BENIGN PROSTATIC HYPERPLASIA WITH LOWER URINARY TRACT SYMP  Calculus of urinary bladder: CALCULUS IN BLADDER  H/o or current diagnosis of HF- no contraindication to ACEI/ARBs  Family history of chronic congestive heart failure  Handoff  Sleep apnea in adult: not diagnosed  Cough in adult patient  Neuropathy  Bladder calculus  BPH without obstruction/lower urinary tract symptoms  History of hypertension  DM (diabetes mellitus)  BPH (benign prostatic hyperplasia)  H/O hemorrhoids  CHF (congestive heart failure)  Hyperlipidemia  MI (myocardial infarction)  CAD (coronary artery disease)  Active smoker: quit in june 2012  Bladder stone  Bladder stone  CHF exacerbation  Prophylactic measure: Prophylactic measure  Benign prostatic hyperplasia, unspecified whether lower urinary tract symptoms present: Benign prostatic hyperplasia, unspecified whether lower urinary tract symptoms present  Bladder calculus: Bladder calculus  History of hypertension: History of hypertension  Type 2 diabetes mellitus without complication, without long-term current use of insulin: Type 2 diabetes mellitus without complication, without long-term current use of insulin  Shortness of breath: Shortness of breath  Cystolitholapaxy of large calculus  History of open heart surgery  Hx of tonsillectomy  Renal stone: surgically removed  BPH (benign prostatic hyperplasia)  Bladder calculus: Bladder calculus  DM (diabetes mellitus)  History of hypertension

## 2017-09-27 NOTE — CHART NOTE - NSCHARTNOTEFT_GEN_A_CORE
Admission HPI:   71 yr old male from home, PMH of CABG (2012 one vessel), HFrEF (EF 25%), DM, HTN, Obese former Smoker. Pt presents with BPH and BPH. Pt had urinary retention Aug 2017 went to ER at [place] had catheterization discharged on the 31 of August and seen by urologist. In the office visit a cystoscope done revealed bladder stones and an enlarged prostate went home with a catheter and removed several days later. Pt schedule for cystoscopy litholapaxy and transurethral resection of prostate with bipolar rectoscopy.     BRIEF HOSPITAL COURSE: pt was taken to the OR today large bladder stone. Pt had litholapaxy, laser lithotripsy and TURP. ICU was called due to worsening SOB. On exam pt with good airway movement bilaterally.   Admitted to ICU for respiratory distress due to Worsening CHF/COPD.    CHF exacerbation. Pt developed SOB after post op worsening SOB s/p 40mg of Lasix IV x 1 pt reported having trouble breathing, on exam pt with adequate air movement. CXR with pulmonary congestion   ABG showing 7.32/47/74/24/50%. Patient was placed on lasix 80mg IV, and strict I/O was done. with 1106 urine output, bronchodilators. Patient clinically improved in morning. Repeated CXR improved. His [place] cathter leaked in morning corrected by irrigation by urology. Heamaturia is resolved.  For his DM patient FS stable,HSS continued.    Patient clinically stable to be downgrade.    Discussed with Attending,

## 2017-09-27 NOTE — DISCHARGE NOTE ADULT - MEDICATION SUMMARY - MEDICATIONS TO TAKE
I will START or STAY ON the medications listed below when I get home from the hospital:    finasteride 5 mg oral tablet  -- 1 tab(s) by mouth once a day  -- Indication: For Benign prostatic hyperplasia, unspecified whether lower urinary tract symptoms present    aspirin 81 mg oral delayed release tablet  -- 1 tab(s) by mouth once a day  -- Indication: For ASCVD    losartan 25 mg oral tablet  -- 1 tab(s) by mouth once a day  -- Indication: For HTN    tamsulosin 0.4 mg oral capsule  -- 1 cap(s) by mouth once a day (at bedtime)  -- Indication: For BPH    Glucophage 500 mg oral tablet  -- 1 tab(s) by mouth 2 times a day  -- Indication: For Diabetes    Crestor 5 mg oral tablet  -- 1 tab(s) by mouth once a day (at bedtime)  -- Indication: For HLD    carvedilol 25 mg oral tablet  -- 1 tab(s) by mouth every 12 hours  -- Indication: For HTN    furosemide 80 mg oral tablet  -- 1 tab(s) by mouth once a day  -- Indication: For CHF    docusate sodium 100 mg oral capsule  --  by mouth , As Needed  -- Indication: For Constipation    Centrum Silver oral tablet  -- 1 tab(s) by mouth once a day  -- Indication: For Supplement

## 2017-09-27 NOTE — DISCHARGE NOTE ADULT - PLAN OF CARE
Resolved You were Short of Breath after procedure you were given Lasix IV (water pill) that took fluid from your body given during procedure,. Now your Shortness of Breath is improved. Please continue all your medications including lasix and follow of with your Primary care doctor Please adhere to low salt diet, Continue taking your BP medications,  follow of with your Primary care doctor Please take your medications and adhere to low carbohydrate diet Please follow up with your Urologist for post operative management continue with mediaction Please follow up with your Urologist for post operative management. You will be sent home with garay catheter Please follow up with your Urologist for post operative management. You will be sent home with garay catheter. Please take oxybutynin 5mg twice a day as needed, continue with medication

## 2017-09-27 NOTE — CONSULT NOTE ADULT - ASSESSMENT
71 yr old male from home, PMH of CABG (2012 one vessel), HFrEF (EF 25%), DM, HTN, Obese former Smoker, presented with BPH and secondary retention. Pt had urinary retention Aug 2017 at which point went to the ER at [place] and had catheterization and discharged on the 31 of August with outpt follow up with urologist. In the office visit a cystoscopy done, bladder stones and an enlarged prostate was revealed whereby it was decided to send home with a catheter with removal several days later. Pt was also scheduled with litholapaxy and transurethral resection of prostate with bipolar rectoscopy.  Now S/P litholapaxy, laser lithotripsy and TURP however pt was noted with worsening SOB. On CXR pt was noted with pulmonary vascular congestion and thereafter has been admitted to ICU for respiratory distress due to worsening CHF/COPD.    1. CHF exacerbation  2. h/o DM / HTN / CAD w/ h/o CABG  3. 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 h/o Nephrolithiases    5. ARF 2/2 pre-renal azotemia vs acute on CRF    -Cont on IV lasix  -Strict I/O and daily weight  -Goal for negative fluid balance  -hold ACEi / ARB and NSAIDs  -Cont on ASA/ statin  -bronchodilator / O2 support  -GI/DVT PPX
71 yr old male with history of CABG (2012 one vessel), HFrEF (EF 25%), DM, HTN, Obese former Smoker. Pt presents with BPH and BPH. Pt had urinary retention Aug 2017 went to ER at [place] had catheterization discharged on the 31 of August and seen by urologist. In the office visit a cystoscope done revealed bladder stones and an enlarged prostate went home with a catheter and removed several days later. Pt schedule for cystoscopy litholapaxy and transurethral resection of prostate with bipolar rectoscopy. Pt was taken to the OR today large bladder stone. Pt had litholapaxy, laser lithotripsy and TURP. ICU was called due to worsening SOB. On exam pt with good airway movement bilaterally.

## 2017-09-27 NOTE — DISCHARGE NOTE ADULT - CARE PROVIDER_API CALL
Jo Culp (MD; MPH), Urology  [address] [facility]  [place] [address]  [place] [address]  Phone: [phone]  Fax: [phone]

## 2017-09-27 NOTE — PROGRESS NOTE ADULT - SUBJECTIVE AND OBJECTIVE BOX
Post Op    Pt c/o mild SOB  Pt had 2 doses of Lasix; + cc, initially hematuria, now clearing    PE: Awake, responsive, NAD  MEDICATIONS  (STANDING):  sodium chloride 0.9% lock flush 3 milliLiter(s) IV Push every 8 hours  ciprofloxacin     Tablet 500 milliGRAM(s) Oral every 12 hours  finasteride 5 milliGRAM(s) Oral daily  aspirin enteric coated 81 milliGRAM(s) Oral daily  losartan 25 milliGRAM(s) Oral daily  tamsulosin 0.4 milliGRAM(s) Oral at bedtime  atorvastatin 20 milliGRAM(s) Oral at bedtime  carvedilol 25 milliGRAM(s) Oral every 12 hours  furosemide    Tablet 80 milliGRAM(s) Oral daily  insulin lispro (HumaLOG) corrective regimen sliding scale   SubCutaneous three times a day before meals  dextrose 5%. 1000 milliLiter(s) (50 mL/Hr) IV Continuous <Continuous>  dextrose 50% Injectable 12.5 Gram(s) IV Push once  dextrose 50% Injectable 25 Gram(s) IV Push once  dextrose 50% Injectable 25 Gram(s) IV Push once  ALBUTerol/ipratropium for Nebulization 3 milliLiter(s) Nebulizer every 6 hours  methylPREDNISolone sodium succinate Injectable 40 milliGRAM(s) IV Push every 8 hours    MEDICATIONS  (PRN):  acetaminophen 300 mG/codeine 30 mG 1 Tablet(s) Oral every 4 hours PRN Moderate Pain (4 - 6)  morphine  - Injectable 2 milliGRAM(s) IV Push every 6 hours PRN Severe Pain  docusate sodium 100 milliGRAM(s) Oral daily PRN Constipation  dextrose Gel 1 Dose(s) Oral once PRN Blood Glucose LESS THAN 70 milliGRAM(s)/deciliter  glucagon  Injectable 1 milliGRAM(s) IntraMuscular once PRN Glucose LESS THAN 70 milligrams/deciliter  ICU Vital Signs Last 24 Hrs  T(C): 36.3 (26 Sep 2017 22:31), Max: 36.9 (26 Sep 2017 16:46)  T(F): 97.4 (26 Sep 2017 22:31), Max: 98.4 (26 Sep 2017 16:46)  HR: 88 (26 Sep 2017 23:00) (64 - 109)  BP: 100/57 (26 Sep 2017 23:00) (100/57 - 143/73)  BP(mean): 67 (26 Sep 2017 23:00) (67 - 99)  ABP: --  ABP(mean): --  RR: 23 (26 Sep 2017 23:00) (14 - 35)  SpO2: 92% (26 Sep 2017 23:00) (87% - 99%)      Chest: B/l BS with some scattered crackles  CVS: S1S2, RRR  Abdomen obese, soft, ND, +BS; some tenderness in lower abdomen  Ext: 1+edema, no calf tenderness    I&O's Detail    26 Sep 2017 07:01  -  26 Sep 2017 23:42  --------------------------------------------------------  IN:    IV PiggyBack: 100 mL    Lactated Ringers IV Bolus: 1500 mL    Oral Fluid: 100 mL  Total IN: 1700 mL    OUT:    Indwelling Catheter - Urethral: 600 mL  Total OUT: 600 mL    Total NET: 1100 mL
INTERVAL HPI/OVERNIGHT EVENTS: *** Patient seen at bedside, He reports of dyspnea is improved. His garay leaked all the night, Urine was partially coming around the catheter and partially in Bag.  UO is 1160. Discussed with Urology PA: regarding Garay catheter leaking. He said he will try irrigation and if it does not work then will change the catheter    PRESSORS: [ ] YES [ ] NO  WHICH:    ANTIBIOTICS:                  DATE STARTED:  ANTIBIOTICS:                  DATE STARTED:  ANTIBIOTICS:                  DATE STARTED:    Antimicrobial:  ciprofloxacin     Tablet 500 milliGRAM(s) Oral every 12 hours    Cardiovascular:  losartan 25 milliGRAM(s) Oral daily  tamsulosin 0.4 milliGRAM(s) Oral at bedtime  carvedilol 25 milliGRAM(s) Oral every 12 hours  furosemide   Injectable 80 milliGRAM(s) IV Push daily    Pulmonary:  ALBUTerol/ipratropium for Nebulization 3 milliLiter(s) Nebulizer every 6 hours    Hematalogic:  aspirin enteric coated 81 milliGRAM(s) Oral daily    Other:  sodium chloride 0.9% lock flush 3 milliLiter(s) IV Push every 8 hours  acetaminophen 300 mG/codeine 30 mG 1 Tablet(s) Oral every 4 hours PRN  morphine  - Injectable 2 milliGRAM(s) IV Push every 6 hours PRN  finasteride 5 milliGRAM(s) Oral daily  atorvastatin 20 milliGRAM(s) Oral at bedtime  docusate sodium 100 milliGRAM(s) Oral daily PRN  insulin lispro (HumaLOG) corrective regimen sliding scale   SubCutaneous three times a day before meals  dextrose 5%. 1000 milliLiter(s) IV Continuous <Continuous>  dextrose Gel 1 Dose(s) Oral once PRN  dextrose 50% Injectable 12.5 Gram(s) IV Push once  dextrose 50% Injectable 25 Gram(s) IV Push once  dextrose 50% Injectable 25 Gram(s) IV Push once  glucagon  Injectable 1 milliGRAM(s) IntraMuscular once PRN    sodium chloride 0.9% lock flush 3 milliLiter(s) IV Push every 8 hours  ciprofloxacin     Tablet 500 milliGRAM(s) Oral every 12 hours  acetaminophen 300 mG/codeine 30 mG 1 Tablet(s) Oral every 4 hours PRN  morphine  - Injectable 2 milliGRAM(s) IV Push every 6 hours PRN  finasteride 5 milliGRAM(s) Oral daily  aspirin enteric coated 81 milliGRAM(s) Oral daily  losartan 25 milliGRAM(s) Oral daily  tamsulosin 0.4 milliGRAM(s) Oral at bedtime  atorvastatin 20 milliGRAM(s) Oral at bedtime  carvedilol 25 milliGRAM(s) Oral every 12 hours  docusate sodium 100 milliGRAM(s) Oral daily PRN  insulin lispro (HumaLOG) corrective regimen sliding scale   SubCutaneous three times a day before meals  dextrose 5%. 1000 milliLiter(s) IV Continuous <Continuous>  dextrose Gel 1 Dose(s) Oral once PRN  dextrose 50% Injectable 12.5 Gram(s) IV Push once  dextrose 50% Injectable 25 Gram(s) IV Push once  dextrose 50% Injectable 25 Gram(s) IV Push once  glucagon  Injectable 1 milliGRAM(s) IntraMuscular once PRN  ALBUTerol/ipratropium for Nebulization 3 milliLiter(s) Nebulizer every 6 hours  furosemide   Injectable 80 milliGRAM(s) IV Push daily    Drug Dosing Weight  Height (cm): 190.5 (26 Sep 2017 07:12)  Weight (kg): 124.6 (26 Sep 2017 22:31)  BMI (kg/m2): 34.3 (26 Sep 2017 22:31)  BSA (m2): 2.51 (26 Sep 2017 22:31)    CENTRAL LINE: [ ] YES [ ] NO  LOCATION:   DATE INSERTED:  REMOVE: [ ] YES [ ] NO  EXPLAIN:    GARAY: [ ] YES [ ] NO    DATE INSERTED:  REMOVE:  [ ] YES [ ] NO  EXPLAIN:    A-LINE:  [ ] YES [ ] NO  LOCATION:   DATE INSERTED:  REMOVE:  [ ] YES [ ] NO  EXPLAIN:    PMH -reviewed admission note, no change since admission  Heart faliure: acute [ ] chronic [ ] acute or chronic [ ] diastolic [ ] systolic [ ] combied systolic and diastolic[ ]  JOHN: ATN[ ] renal medullary necrosis [ ] CKD I [ ]CKDII [ ]CKD III [ ]CKD IV [ ]CKD V [ ]Other pathological lesions [ ]  Abdominal Nutrition Status: malnutrition [ ] cachexia [ ] morbid obesity/BMI=40 [ ] Supplement ordered [___________]     ICU Vital Signs Last 24 Hrs  T(C): 36.8 (27 Sep 2017 08:00), Max: 36.9 (26 Sep 2017 16:46)  T(F): 98.2 (27 Sep 2017 08:00), Max: 98.4 (26 Sep 2017 16:46)  HR: 69 (27 Sep 2017 08:00) (68 - 109)  BP: 118/60 (27 Sep 2017 08:00) (100/57 - 143/81)  BP(mean): 73 (27 Sep 2017 08:00) (67 - 99)  ABP: --  ABP(mean): --  RR: 15 (27 Sep 2017 08:00) (15 - 35)  SpO2: 100% (27 Sep 2017 08:00) (87% - 100%)      ABG - ( 26 Sep 2017 20:23 )  pH: 7.32  /  pCO2: 47    /  pO2: 74    / HCO3: 24    / Base Excess: -2.3  /  SaO2: 93                    09-26 @ 07:01  -  09-27 @ 07:00  --------------------------------------------------------  IN: 1775 mL / OUT: 669 mL / NET: 1106 mL            PHYSICAL EXAM:    GENERAL: [ ]NAD, on nasal cannula  HEAD:  [ ]Atraumatic, [ ]Normocephalic  EYES: [ ]EOMI, [ ]PERRLA, [ ]conjunctiva and sclera clear  ENMT: [ ]No tonsillar erythema, exudates, or enlargement; [ ]Moist mucous membranes, [ ]Good dentition, [ ]No lesions  NECK: [ ]Supple, normal appearance, [ ]No JVD; [ ]Normal thyroid; [ ]Trachea midline  NERVOUS SYSTEM:  [ ]Alert & Oriented X3,   CHEST/LUNG: Clear to auscultation[ ]No chest deformity; [ ]Normal percussion bilaterally; [ ]No rales, rhonchi, wheezing   HEART: [ ]Regular rate and rhythm; [ ]No murmurs, rubs, or gallops  ABDOMEN: [ ]Soft, Nontender, Nondistended; [ ]Bowel sounds present, Garay in placed, Urine is leaking around the garay.   EXTREMITIES:  [ ]2+ Peripheral Pulses, [ ]No clubbing, cyanosis, or edema  LYMPH: [ ]No lymphadenopathy noted  SKIN: [ ]No rashes or lesions; [ ]Good capillary refill      LABS:  CBC Full  -  ( 27 Sep 2017 04:03 )  WBC Count : 14.5 K/uL  Hemoglobin : 14.8 g/dL  Hematocrit : 45.2 %  Platelet Count - Automated : 232 K/uL  Mean Cell Volume : 91.3 fl  Mean Cell Hemoglobin : 29.9 pg  Mean Cell Hemoglobin Concentration : 32.7 gm/dL  Auto Neutrophil # : x  Auto Lymphocyte # : x  Auto Monocyte # : x  Auto Eosinophil # : x  Auto Basophil # : x  Auto Neutrophil % : x  Auto Lymphocyte % : x  Auto Monocyte % : x  Auto Eosinophil % : x  Auto Basophil % : x    09-27    138  |  105  |  27<H>  ----------------------------<  156<H>  4.7   |  25  |  1.52<H>    Ca    9.2      27 Sep 2017 04:03  Phos  3.5     09-27  Mg     2.1     09-27      PT/INR - ( 27 Sep 2017 04:03 )   PT: 12.5 sec;   INR: 1.14 ratio                 RADIOLOGY & ADDITIONAL STUDIES REVIEWED:  ***    [ ]GOALS OF CARE DISCUSSION WITH PATIENT/FAMILY/PROXY:    CRITICAL CARE TIME SPENT: 35 minutes
Pt c/o urine leaking around garay overnight.  VSS a/f  Abd; soft nt nd no cvat  no penile or scrotal swelling or erythema  garay with yellow urine in tubing.  lungs; scant rales bilat bases. good air entry bilat.    Ext; no calf tenderness or erythema                          14.8   14.5  )-----------( 232      ( 27 Sep 2017 04:03 )             45.2     09-27    138  |  105  |  27<H>  ----------------------------<  156<H>  4.7   |  25  |  1.52<H>    Ca    9.2      27 Sep 2017 04:03  Phos  3.5     09-27  Mg     2.1     09-27

## 2017-09-27 NOTE — PROGRESS NOTE ADULT - ATTENDING COMMENTS
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 Pt presents with BPH and BPH. Pt had urinary retention Aug 2017 went to ER at [facility] had catheterization discharged on the 31 of August and seen by urologist. In the office visit a cystoscope done revealed bladder stones and an enlarged prostate went home with a catheter and removed several days later. Pt schedule for cystoscopy litholapaxy and transurethral resection of prostate with bipolar rectoscopy.     BRIEF HOSPITAL COURSE: pt was taken to the OR today large bladder stone. Pt had litholapaxy, laser lithotripsy and TURP. ICU was called due to worsening SOB. On exam pt with good airway movement bilaterally.   Admitted to ICU for respiratory distress due to Worsening CHF/COPD     Problem/Recommendation - 1:  Problem: CHF exacerbation. Recommendation: pt developed SOB after post op worsening SOB   pt was given 40mg of Lasix IV x 1   pt reported having trouble breathing, on exam pt with adequate air movement  CXR with pulmonary congestion   ABG showing 7.32/47/74/24/50%  likely 2/2 CHF exacerbation   I/O: 1106  Repeat CXR shows:  c/w bronchodilators   c/w Lasix 80mg IV daily   c/w negative balance   c/w strict I & O and negative balance.     Problem/Recommendation - 2:  ·  Problem: Type 2 diabetes mellitus without complication, without long-term current use of insulin.  Recommendation: last HbA1C level 7.8  Fs: stable  continue with HSS.      Problem/Recommendation - 3:  ·  Problem: History of hypertension.  Recommendation: BP stable. pt takes Coreg 25mg BID, lasix 80mg daily and losartan 25mg daily  continue with home medications.      Problem/Recommendation - 4:  ·  Problem: Bladder calculus.  Recommendation: continue with post op monitoring   c/w Ciprofloxacin 500mg BID  c/w FC Leaking. As per urology, Irrigation will be done if it worked otherwise will change the catheter.  f/u surgery.      Problem/Recommendation - 5:  ·  Problem: Benign prostatic hyperplasia, unspecified whether lower urinary tract symptoms present.  Recommendation: continue with Flomax 0.5mg daily and finasteride 5mg daily.      Problem/Recommendation - 6:  Problem: Prophylactic measure. Recommendation: c/w DVT and GI ppx.    for possible d/c home today
Agree with PA note as stated above. Patient currently resting comfortably with no complaints. DId have some mild bladder spasms overnight.    AF, VSS  Abd: soft, nt/nd  : garay draining clear yellow urine                    14.8   14.5  )-----------( 232      ( 27 Sep 2017 04:03 )             45.2     09-27    138  |  105  |  27<H>  ----------------------------<  156<H>  4.7   |  25  |  1.52<H>    A/P POD#1 s/p cystolitholapaxy with laser lithotripsy. Admitted overnight for observation    - discharge home  - oxybutynin 5mg q12 hrs PRN bladder spasms. Patient instructed to stop taking oxybutynin the day before his trial of void  - Follow-up in approximately 1 week for trial of void

## 2017-09-27 NOTE — DISCHARGE NOTE ADULT - CARE PLAN
Principal Discharge DX:	CHF exacerbation  Goal:	Resolved  Instructions for follow-up, activity and diet:	You were Short of Breath after procedure you were given Lasix IV (water pill) that took fluid from your body given during procedure,. Now your Shortness of Breath is improved. Please continue all your medications including lasix and follow of with your Primary care doctor  Secondary Diagnosis:	History of hypertension  Instructions for follow-up, activity and diet:	Please adhere to low salt diet, Continue taking your BP medications,  follow of with your Primary care doctor  Secondary Diagnosis:	DM (diabetes mellitus)  Instructions for follow-up, activity and diet:	Please take your medications and adhere to low carbohydrate diet  Secondary Diagnosis:	Bladder calculus  Instructions for follow-up, activity and diet:	Please follow up with your Urologist for post operative management  Secondary Diagnosis:	BPH (benign prostatic hyperplasia)  Instructions for follow-up, activity and diet:	continue with mediaction Principal Discharge DX:	CHF exacerbation  Goal:	Resolved  Instructions for follow-up, activity and diet:	You were Short of Breath after procedure you were given Lasix IV (water pill) that took fluid from your body given during procedure,. Now your Shortness of Breath is improved. Please continue all your medications including lasix and follow of with your Primary care doctor  Secondary Diagnosis:	History of hypertension  Instructions for follow-up, activity and diet:	Please adhere to low salt diet, Continue taking your BP medications,  follow of with your Primary care doctor  Secondary Diagnosis:	DM (diabetes mellitus)  Instructions for follow-up, activity and diet:	Please take your medications and adhere to low carbohydrate diet  Secondary Diagnosis:	Bladder calculus  Instructions for follow-up, activity and diet:	Please follow up with your Urologist for post operative management. You will be sent home with garay catheter  Secondary Diagnosis:	BPH (benign prostatic hyperplasia)  Instructions for follow-up, activity and diet:	continue with mediaction Principal Discharge DX:	CHF exacerbation  Goal:	Resolved  Instructions for follow-up, activity and diet:	You were Short of Breath after procedure you were given Lasix IV (water pill) that took fluid from your body given during procedure,. Now your Shortness of Breath is improved. Please continue all your medications including lasix and follow of with your Primary care doctor  Secondary Diagnosis:	History of hypertension  Instructions for follow-up, activity and diet:	Please adhere to low salt diet, Continue taking your BP medications,  follow of with your Primary care doctor  Secondary Diagnosis:	DM (diabetes mellitus)  Instructions for follow-up, activity and diet:	Please take your medications and adhere to low carbohydrate diet  Secondary Diagnosis:	Bladder calculus  Instructions for follow-up, activity and diet:	Please follow up with your Urologist for post operative management. You will be sent home with garay catheter. Please take oxybutynin 5mg twice a day as needed,  Secondary Diagnosis:	BPH (benign prostatic hyperplasia)  Instructions for follow-up, activity and diet:	continue with medication

## 2017-10-02 ENCOUNTER — APPOINTMENT (OUTPATIENT)
Dept: UROLOGY | Facility: CLINIC | Age: 72
End: 2017-10-02
Payer: MEDICARE

## 2017-10-02 VITALS
HEIGHT: 73 IN | RESPIRATION RATE: 18 BRPM | HEART RATE: 41 BPM | DIASTOLIC BLOOD PRESSURE: 70 MMHG | BODY MASS INDEX: 36.18 KG/M2 | TEMPERATURE: 97.8 F | SYSTOLIC BLOOD PRESSURE: 118 MMHG | WEIGHT: 273 LBS

## 2017-10-02 DIAGNOSIS — E11.9 TYPE 2 DIABETES MELLITUS WITHOUT COMPLICATIONS: ICD-10-CM

## 2017-10-02 DIAGNOSIS — N20.1 CALCULUS OF URETER: ICD-10-CM

## 2017-10-02 DIAGNOSIS — E66.9 OBESITY, UNSPECIFIED: ICD-10-CM

## 2017-10-02 DIAGNOSIS — N40.1 BENIGN PROSTATIC HYPERPLASIA WITH LOWER URINARY TRACT SYMPTOMS: ICD-10-CM

## 2017-10-02 DIAGNOSIS — I50.23 ACUTE ON CHRONIC SYSTOLIC (CONGESTIVE) HEART FAILURE: ICD-10-CM

## 2017-10-02 DIAGNOSIS — E78.5 HYPERLIPIDEMIA, UNSPECIFIED: ICD-10-CM

## 2017-10-02 DIAGNOSIS — I97.131 POSTPROCEDURAL HEART FAILURE FOLLOWING OTHER SURGERY: ICD-10-CM

## 2017-10-02 DIAGNOSIS — I50.43 ACUTE ON CHRONIC COMBINED SYSTOLIC (CONGESTIVE) AND DIASTOLIC (CONGESTIVE) HEART FAILURE: ICD-10-CM

## 2017-10-02 DIAGNOSIS — J44.1 CHRONIC OBSTRUCTIVE PULMONARY DISEASE WITH (ACUTE) EXACERBATION: ICD-10-CM

## 2017-10-02 DIAGNOSIS — I11.0 HYPERTENSIVE HEART DISEASE WITH HEART FAILURE: ICD-10-CM

## 2017-10-02 DIAGNOSIS — N21.0 CALCULUS IN BLADDER: ICD-10-CM

## 2017-10-02 PROCEDURE — 99024 POSTOP FOLLOW-UP VISIT: CPT

## 2017-10-09 ENCOUNTER — APPOINTMENT (OUTPATIENT)
Dept: CARDIOLOGY | Facility: CLINIC | Age: 72
End: 2017-10-09

## 2017-11-09 ENCOUNTER — APPOINTMENT (OUTPATIENT)
Dept: UROLOGY | Facility: CLINIC | Age: 72
End: 2017-11-09
Payer: MEDICARE

## 2017-11-09 VITALS
TEMPERATURE: 97 F | WEIGHT: 284 LBS | HEART RATE: 78 BPM | HEIGHT: 73 IN | BODY MASS INDEX: 37.64 KG/M2 | DIASTOLIC BLOOD PRESSURE: 74 MMHG | RESPIRATION RATE: 18 BRPM | SYSTOLIC BLOOD PRESSURE: 120 MMHG

## 2017-11-09 DIAGNOSIS — N32.89 OTHER SPECIFIED DISORDERS OF BLADDER: ICD-10-CM

## 2017-11-09 PROCEDURE — 99213 OFFICE O/P EST LOW 20 MIN: CPT | Mod: 24

## 2018-02-15 ENCOUNTER — APPOINTMENT (OUTPATIENT)
Dept: UROLOGY | Facility: CLINIC | Age: 73
End: 2018-02-15
Payer: MEDICARE

## 2018-02-15 VITALS
HEART RATE: 72 BPM | RESPIRATION RATE: 18 BRPM | DIASTOLIC BLOOD PRESSURE: 80 MMHG | SYSTOLIC BLOOD PRESSURE: 130 MMHG | TEMPERATURE: 97.8 F | WEIGHT: 284 LBS | HEIGHT: 73 IN | BODY MASS INDEX: 37.64 KG/M2

## 2018-02-15 PROCEDURE — 99213 OFFICE O/P EST LOW 20 MIN: CPT

## 2018-02-15 RX ORDER — CIPROFLOXACIN HYDROCHLORIDE 500 MG/1
500 TABLET, FILM COATED ORAL
Qty: 2 | Refills: 0 | Status: COMPLETED | COMMUNITY
Start: 2017-08-31 | End: 2018-02-15

## 2018-04-09 ENCOUNTER — APPOINTMENT (OUTPATIENT)
Dept: ULTRASOUND IMAGING | Facility: IMAGING CENTER | Age: 73
End: 2018-04-09
Payer: MEDICARE

## 2018-04-09 ENCOUNTER — OUTPATIENT (OUTPATIENT)
Dept: OUTPATIENT SERVICES | Facility: HOSPITAL | Age: 73
LOS: 1 days | End: 2018-04-09
Payer: MEDICARE

## 2018-04-09 DIAGNOSIS — N21.0 CALCULUS IN BLADDER: ICD-10-CM

## 2018-04-09 DIAGNOSIS — Z98.890 OTHER SPECIFIED POSTPROCEDURAL STATES: Chronic | ICD-10-CM

## 2018-04-09 PROCEDURE — 76770 US EXAM ABDO BACK WALL COMP: CPT | Mod: 26

## 2018-04-09 PROCEDURE — 76770 US EXAM ABDO BACK WALL COMP: CPT

## 2018-05-25 ENCOUNTER — APPOINTMENT (OUTPATIENT)
Dept: CARDIOLOGY | Facility: CLINIC | Age: 73
End: 2018-05-25
Payer: MEDICARE

## 2018-05-25 VITALS
HEART RATE: 67 BPM | BODY MASS INDEX: 37.77 KG/M2 | DIASTOLIC BLOOD PRESSURE: 74 MMHG | SYSTOLIC BLOOD PRESSURE: 120 MMHG | HEIGHT: 73 IN | OXYGEN SATURATION: 94 % | TEMPERATURE: 98.3 F | WEIGHT: 285 LBS

## 2018-05-25 PROCEDURE — 99214 OFFICE O/P EST MOD 30 MIN: CPT | Mod: 25

## 2018-05-25 PROCEDURE — 93306 TTE W/DOPPLER COMPLETE: CPT

## 2018-06-08 ENCOUNTER — APPOINTMENT (OUTPATIENT)
Dept: CARDIOLOGY | Facility: CLINIC | Age: 73
End: 2018-06-08
Payer: MEDICARE

## 2018-06-08 ENCOUNTER — NON-APPOINTMENT (OUTPATIENT)
Age: 73
End: 2018-06-08

## 2018-06-08 VITALS
SYSTOLIC BLOOD PRESSURE: 107 MMHG | WEIGHT: 290 LBS | TEMPERATURE: 99 F | DIASTOLIC BLOOD PRESSURE: 53 MMHG | OXYGEN SATURATION: 94 % | BODY MASS INDEX: 38.43 KG/M2 | HEART RATE: 75 BPM | HEIGHT: 73 IN

## 2018-06-08 DIAGNOSIS — I50.23 ACUTE ON CHRONIC SYSTOLIC (CONGESTIVE) HEART FAILURE: ICD-10-CM

## 2018-06-08 PROCEDURE — 99213 OFFICE O/P EST LOW 20 MIN: CPT

## 2018-06-08 PROCEDURE — 36415 COLL VENOUS BLD VENIPUNCTURE: CPT

## 2018-06-10 LAB
ALBUMIN SERPL ELPH-MCNC: 4.2 G/DL
ALP BLD-CCNC: 79 U/L
ALT SERPL-CCNC: 16 U/L
ANION GAP SERPL CALC-SCNC: 19 MMOL/L
AST SERPL-CCNC: 18 U/L
BASOPHILS # BLD AUTO: 0.05 K/UL
BASOPHILS NFR BLD AUTO: 0.5 %
BILIRUB SERPL-MCNC: 0.2 MG/DL
BUN SERPL-MCNC: 29 MG/DL
CALCIUM SERPL-MCNC: 9.7 MG/DL
CHLORIDE SERPL-SCNC: 101 MMOL/L
CO2 SERPL-SCNC: 22 MMOL/L
CREAT SERPL-MCNC: 1.74 MG/DL
EOSINOPHIL # BLD AUTO: 0.32 K/UL
EOSINOPHIL NFR BLD AUTO: 3.5 %
GLUCOSE SERPL-MCNC: 160 MG/DL
HCT VFR BLD CALC: 37.9 %
HGB BLD-MCNC: 12 G/DL
IMM GRANULOCYTES NFR BLD AUTO: 0.3 %
LYMPHOCYTES # BLD AUTO: 2.51 K/UL
LYMPHOCYTES NFR BLD AUTO: 27.5 %
MAN DIFF?: NORMAL
MCHC RBC-ENTMCNC: 30.5 PG
MCHC RBC-ENTMCNC: 31.7 GM/DL
MCV RBC AUTO: 96.4 FL
MONOCYTES # BLD AUTO: 0.75 K/UL
MONOCYTES NFR BLD AUTO: 8.2 %
NEUTROPHILS # BLD AUTO: 5.47 K/UL
NEUTROPHILS NFR BLD AUTO: 60 %
NT-PROBNP SERPL-MCNC: 1626 PG/ML
PLATELET # BLD AUTO: 283 K/UL
POTASSIUM SERPL-SCNC: 4.8 MMOL/L
PROT SERPL-MCNC: 7.6 G/DL
RBC # BLD: 3.93 M/UL
RBC # FLD: 14.6 %
SODIUM SERPL-SCNC: 142 MMOL/L
WBC # FLD AUTO: 9.13 K/UL

## 2018-08-03 ENCOUNTER — APPOINTMENT (OUTPATIENT)
Dept: UROLOGY | Facility: CLINIC | Age: 73
End: 2018-08-03
Payer: MEDICARE

## 2018-08-03 DIAGNOSIS — N13.8 BENIGN PROSTATIC HYPERPLASIA WITH LOWER URINARY TRACT SYMPMS: ICD-10-CM

## 2018-08-03 DIAGNOSIS — N40.1 BENIGN PROSTATIC HYPERPLASIA WITH LOWER URINARY TRACT SYMPMS: ICD-10-CM

## 2018-08-03 PROCEDURE — 99213 OFFICE O/P EST LOW 20 MIN: CPT | Mod: 25

## 2018-08-03 PROCEDURE — 51798 US URINE CAPACITY MEASURE: CPT

## 2018-08-11 ENCOUNTER — RX RENEWAL (OUTPATIENT)
Age: 73
End: 2018-08-11

## 2018-11-09 ENCOUNTER — RX RENEWAL (OUTPATIENT)
Age: 73
End: 2018-11-09

## 2018-11-26 PROBLEM — R05 COUGH: Chronic | Status: ACTIVE | Noted: 2017-09-21

## 2018-11-26 PROBLEM — E11.9 TYPE 2 DIABETES MELLITUS WITHOUT COMPLICATIONS: Chronic | Status: ACTIVE | Noted: 2017-09-21

## 2018-11-26 PROBLEM — N21.0 CALCULUS IN BLADDER: Chronic | Status: ACTIVE | Noted: 2017-09-21

## 2018-11-26 PROBLEM — N40.0 BENIGN PROSTATIC HYPERPLASIA WITHOUT LOWER URINARY TRACT SYMPTOMS: Chronic | Status: ACTIVE | Noted: 2017-09-21

## 2018-11-26 PROBLEM — G47.30 SLEEP APNEA, UNSPECIFIED: Chronic | Status: ACTIVE | Noted: 2017-09-21

## 2018-11-26 PROBLEM — G62.9 POLYNEUROPATHY, UNSPECIFIED: Chronic | Status: ACTIVE | Noted: 2017-09-21

## 2018-11-26 PROBLEM — Z86.79 PERSONAL HISTORY OF OTHER DISEASES OF THE CIRCULATORY SYSTEM: Chronic | Status: ACTIVE | Noted: 2017-09-21

## 2018-12-11 PROBLEM — K62.89 RECTAL PAIN: Status: ACTIVE | Noted: 2018-12-11

## 2018-12-12 ENCOUNTER — APPOINTMENT (OUTPATIENT)
Dept: SURGERY | Facility: CLINIC | Age: 73
End: 2018-12-12
Payer: MEDICARE

## 2018-12-12 VITALS
OXYGEN SATURATION: 95 % | WEIGHT: 275 LBS | SYSTOLIC BLOOD PRESSURE: 117 MMHG | HEIGHT: 74 IN | DIASTOLIC BLOOD PRESSURE: 60 MMHG | BODY MASS INDEX: 35.29 KG/M2 | TEMPERATURE: 97.7 F | HEART RATE: 71 BPM | RESPIRATION RATE: 18 BRPM

## 2018-12-12 DIAGNOSIS — K64.2 THIRD DEGREE HEMORRHOIDS: ICD-10-CM

## 2018-12-12 DIAGNOSIS — K62.89 OTHER SPECIFIED DISEASES OF ANUS AND RECTUM: ICD-10-CM

## 2018-12-12 DIAGNOSIS — N21.0 CALCULUS IN BLADDER: ICD-10-CM

## 2018-12-12 DIAGNOSIS — K59.09 OTHER CONSTIPATION: ICD-10-CM

## 2018-12-12 DIAGNOSIS — K64.3 FOURTH DEGREE HEMORRHOIDS: ICD-10-CM

## 2018-12-12 PROCEDURE — 99204 OFFICE O/P NEW MOD 45 MIN: CPT | Mod: 25

## 2018-12-12 PROCEDURE — 46221 LIGATION OF HEMORRHOID(S): CPT

## 2018-12-12 RX ORDER — TRIAMCINOLONE ACETONIDE 5 MG/G
CREAM TOPICAL
Refills: 0 | Status: ACTIVE | COMMUNITY

## 2018-12-12 RX ORDER — UMECLIDINIUM 62.5 UG/1
62.5 AEROSOL, POWDER ORAL
Refills: 0 | Status: ACTIVE | COMMUNITY

## 2018-12-12 RX ORDER — CANAGLIFLOZIN 300 MG/1
300 TABLET, FILM COATED ORAL DAILY
Qty: 90 | Refills: 0 | Status: DISCONTINUED | COMMUNITY
Start: 2018-05-25 | End: 2018-12-12

## 2018-12-12 RX ORDER — CANAGLIFLOZIN 100 MG/1
100 TABLET, FILM COATED ORAL DAILY
Qty: 10 | Refills: 2 | Status: DISCONTINUED | COMMUNITY
Start: 2018-05-25 | End: 2018-12-12

## 2019-01-07 NOTE — DISCHARGE NOTE ADULT - NS AS DC FU INST LIST INST
88 yo female pw 2 days of increasing dizziness worse with changing positions.pt states she is currently being treated for viral bronchitis withprednisone.
yes

## 2019-02-07 ENCOUNTER — RX RENEWAL (OUTPATIENT)
Age: 74
End: 2019-02-07

## 2019-03-05 ENCOUNTER — APPOINTMENT (OUTPATIENT)
Dept: CARDIOLOGY | Facility: CLINIC | Age: 74
End: 2019-03-05
Payer: MEDICARE

## 2019-03-05 ENCOUNTER — NON-APPOINTMENT (OUTPATIENT)
Age: 74
End: 2019-03-05

## 2019-03-05 VITALS
TEMPERATURE: 98.1 F | WEIGHT: 279 LBS | OXYGEN SATURATION: 93 % | HEIGHT: 74 IN | HEART RATE: 88 BPM | BODY MASS INDEX: 35.81 KG/M2 | DIASTOLIC BLOOD PRESSURE: 86 MMHG | SYSTOLIC BLOOD PRESSURE: 144 MMHG

## 2019-03-05 DIAGNOSIS — Z01.810 ENCOUNTER FOR PREPROCEDURAL CARDIOVASCULAR EXAMINATION: ICD-10-CM

## 2019-03-05 PROCEDURE — 93000 ELECTROCARDIOGRAM COMPLETE: CPT

## 2019-03-05 PROCEDURE — 99215 OFFICE O/P EST HI 40 MIN: CPT

## 2019-03-05 PROCEDURE — 36415 COLL VENOUS BLD VENIPUNCTURE: CPT

## 2019-03-05 NOTE — CARDIOLOGY SUMMARY
[Ant Wall Defect] : anterior wall defect [Inf Wall Defect] : inferior wall defect [LVEF ___%] : LVEF [unfilled]% [Moderate] : moderate LV dysfunction [None] : no pulmonary hypertension [Enlarged] : enlarged LA size [Mild] : mild mitral regurgitation [___] : [unfilled]

## 2019-03-07 ENCOUNTER — RECORD ABSTRACTING (OUTPATIENT)
Age: 74
End: 2019-03-07

## 2019-03-07 LAB
ALBUMIN SERPL ELPH-MCNC: 4.7 G/DL
ALP BLD-CCNC: 86 U/L
ALT SERPL-CCNC: 19 U/L
ANION GAP SERPL CALC-SCNC: 14 MMOL/L
AST SERPL-CCNC: 16 U/L
BASOPHILS # BLD AUTO: 0.09 K/UL
BASOPHILS NFR BLD AUTO: 1 %
BILIRUB SERPL-MCNC: 0.3 MG/DL
BUN SERPL-MCNC: 30 MG/DL
CALCIUM SERPL-MCNC: 10.3 MG/DL
CHLORIDE SERPL-SCNC: 102 MMOL/L
CHOLEST SERPL-MCNC: 175 MG/DL
CHOLEST/HDLC SERPL: 4 RATIO
CO2 SERPL-SCNC: 25 MMOL/L
CREAT SERPL-MCNC: 1.92 MG/DL
EOSINOPHIL # BLD AUTO: 0.25 K/UL
EOSINOPHIL NFR BLD AUTO: 2.8 %
GLUCOSE SERPL-MCNC: 138 MG/DL
HBA1C MFR BLD HPLC: 7.6 %
HCT VFR BLD CALC: 44.5 %
HDLC SERPL-MCNC: 44 MG/DL
HGB BLD-MCNC: 14.1 G/DL
IMM GRANULOCYTES NFR BLD AUTO: 0.2 %
LDLC SERPL CALC-MCNC: 72 MG/DL
LYMPHOCYTES # BLD AUTO: 2.37 K/UL
LYMPHOCYTES NFR BLD AUTO: 26.8 %
MAN DIFF?: NORMAL
MCHC RBC-ENTMCNC: 30.1 PG
MCHC RBC-ENTMCNC: 31.7 GM/DL
MCV RBC AUTO: 95.1 FL
MONOCYTES # BLD AUTO: 0.79 K/UL
MONOCYTES NFR BLD AUTO: 8.9 %
NEUTROPHILS # BLD AUTO: 5.33 K/UL
NEUTROPHILS NFR BLD AUTO: 60.3 %
NT-PROBNP SERPL-MCNC: 1045 PG/ML
PLATELET # BLD AUTO: 270 K/UL
POTASSIUM SERPL-SCNC: 5 MMOL/L
PROT SERPL-MCNC: 7.5 G/DL
RBC # BLD: 4.68 M/UL
RBC # FLD: 13.5 %
SODIUM SERPL-SCNC: 141 MMOL/L
TRIGL SERPL-MCNC: 297 MG/DL
TSH SERPL-ACNC: 3.8 UIU/ML
WBC # FLD AUTO: 8.85 K/UL

## 2019-03-07 NOTE — PHYSICAL EXAM
[General Appearance - Well Developed] : well developed [Normal Appearance] : normal appearance [Well Groomed] : well groomed [General Appearance - Well Nourished] : well nourished [No Deformities] : no deformities [General Appearance - In No Acute Distress] : no acute distress [Normal Conjunctiva] : the conjunctiva exhibited no abnormalities [Eyelids - No Xanthelasma] : the eyelids demonstrated no xanthelasmas [Normal Oral Mucosa] : normal oral mucosa [No Oral Pallor] : no oral pallor [No Oral Cyanosis] : no oral cyanosis [Normal Jugular Venous A Waves Present] : normal jugular venous A waves present [Normal Jugular Venous V Waves Present] : normal jugular venous V waves present [No Jugular Venous Laura A Waves] : no jugular venous laura A waves [Respiration, Rhythm And Depth] : normal respiratory rhythm and effort [Exaggerated Use Of Accessory Muscles For Inspiration] : no accessory muscle use [Heart Rate And Rhythm] : heart rate and rhythm were normal [Heart Sounds] : normal S1 and S2 [Murmurs] : no murmurs present [Abdomen Soft] : soft [Abdomen Tenderness] : non-tender [Abdomen Mass (___ Cm)] : no abdominal mass palpated [Abnormal Walk] : normal gait [Nail Clubbing] : no clubbing of the fingernails [Cyanosis, Localized] : no localized cyanosis [Petechial Hemorrhages (___cm)] : no petechial hemorrhages [Skin Color & Pigmentation] : normal skin color and pigmentation [] : no rash [No Venous Stasis] : no venous stasis [Skin Lesions] : no skin lesions [No Skin Ulcers] : no skin ulcer [No Xanthoma] : no  xanthoma was observed [Oriented To Time, Place, And Person] : oriented to person, place, and time [Affect] : the affect was normal [Mood] : the mood was normal [No Anxiety] : not feeling anxious [FreeTextEntry1] : ? only trace edema. Pedal pulses present today left, ?right

## 2019-03-07 NOTE — REVIEW OF SYSTEMS
[Dyspnea on exertion] : dyspnea during exertion [Lower Ext Edema] : lower extremity edema [Urinary Frequency] : urinary frequency [Nocturia] : nocturia [Joint Pain] : joint pain [Joint Stiffness] : joint stiffness [see HPI] : see HPI [Numbness (Hypesthesia)] : numbness [Recent Weight Loss (___ Lbs)] : recent [unfilled] ~Ulb weight loss [Recent Weight Gain (___ Lbs)] : no recent weight gain [Shortness Of Breath] : no shortness of breath [Chest Pain] : no chest pain [Leg Claudication] : no intermittent leg claudication [Palpitations] : no palpitations [Cough] : no cough [Dizziness] : no dizziness [FreeTextEntry1] : [place] on occasion

## 2019-03-07 NOTE — HISTORY OF PRESENT ILLNESS
[FreeTextEntry1] : (MED HX) Patient developed shortness of breath while trying to cut his lawn in about March or April of 2012. Gradually he developed classic PND and orthopnea and came to see me on May 3. Subsequent workup found dilated cardiomyopathy both global and segmental and led to cardiac catheterization on May 8. This revealed occluded proximal circumflex, occluded proximal right coronary artery, 75% mid LAD with a 20% ostial left main. LVEF was 20% a viability study was done on May 14 which showed significant viable myocardium. LVEF post stress with 25%. Based on these studies it was elected to try coronary artery bypass surgery to improve his LV function even though Dr. Bell of cardiology felt we were most likely dealing with a cardiomyopathy separate from his coronary disease. In any case coronary artery bypass was done in June but the right and circumflex were so small and occluded that he could only get a mammary graft into his LAD. The patient's postop course was uncomplicated except for nonsustained V. tach. The patient underwent an EP study on June 13 and was noninducible. By regulation he would have to wait 3 months after bypass for consideration of AICD therapy.  He had a followup echo in July 24 that was suboptimal and did show some improvement in the anterior and apical regions but overall still had severe global dysfunction.  He has had no episodes of dizziness or lightheadedness to suggest an  arrhythmia, and certainly no syncope or near-syncope. He had a followup MUGA on September 19, 2012 that showed a left ventricular ejection fraction of 28%. He then saw Dr. Willson of [facility] in consult who recommended  AICD but the patient  still wanted to hold off and continue on his medical therapy. His Coreg was increased to 6.25-12.5 b.i.d. and then to 25 bid in February, 2013. He has gained a lot of weight since stopping smoking. He does have mild amount of walking, mostly around the casinos, regular walking is still limited by claudication on and off. He denies chest pain, shortness of breath unless walks more than a block, palpitations, syncope or near-syncope. He said he occasionally gets a little dizzy when he starts to exercise or changing positions but not to the point of near syncope. He saw Dr. Brower on January 25, 2012 after a viral illness, possibly the flu at the end of December and the beginning of January. His labs were acceptable with a cholesterol of 188, HDL 43, LDL 65, triglycerides 400, hemoglobin A1c 6.4, and BUN 28 creatinine 1.27.\par April 23, 2013 Here for followup. His weight has continued to go up and he has developed somewhat of a postnasal drip. He has occasional edema on the right leg. He has dyspnea on exertion he does more than one block but no chest pain. He has had no syncope or near syncope or palpitations. He is tolerating the higher dose of Coreg. I again recommended AICD and I asked him to come back in a couple of months for an echocardiogram.\par February 12, 2014. Here for followup after I would not renew his medications without a visit. He never did have the echocardiogram or consider the AICD. On the whole he is doing fairly well. He never resumed smoking. His weight has leveled off. He does have shortness of breath with exertion but it is stable and he is able to walk a long hallway here without stopping and can usually get to the top of a flight of stairs without feeling too winded although he does walk slowly. He does not have any angina. He has not had any syncope or near syncope or any palpitations. His medications have not changed. He does have a chronic cough with some phlegm consistent with chronic bronchitis. He does not have a pulmonary doctor that he sees on a regular basis. His EKG today is sinus rhythm with a first-degree AV block and unchanged with nonspecific T-wave changes.\par March 11, 2014. The patient returned for followup on echocardiogram. The test was significantly suboptimal but his anteroseptal wall is akinetic and overall LV EF is around 25-30%. He promised to see Dr. Willson and discuss AICD and whether he is a candidate for a biventricular pacer when he comes back from his vacation to [place].\par January 22, 2015. The patient finally returns for followup visit when I would not renew his medications. He never made an appointment with [facility] after his vacation to [place]. He says he is "afraid of hospitals".  He has had chronic PND and orthopnea and he follows his weights very closely. If his weight is a little bit high he takes the furosemide but he was not taking it every day. On Christmas Eve he says he was fine but starting on Veronica Day he had severe shortness of breath and a cough with some phlegm. He saw Dr. Brower who placed him on Levaquin and he has had significant improvement. However he still has PND, orthopnea, and occasional edema. Since Christmas he has been on 40 mg daily of Lasix almost consistently. No exertional chest pain. He has not been able to get his weight down. No palpitations. No syncope or near syncope. His EKG is still sinus rhythm without a left bundle branch block.\par March 18, 2016. First visit since January of last year. Patient with no new complaints. Still has significant dyspnea on exertion which his wife thinks is worse.. He does have orthostatic dizziness, but he knows to wait a minute, and then he can go without any problems. No palpitations, syncope, or near-syncope.  No interim hospitalizations or medical procedures. No change in medication. We discussed the possibility of trying Entresto. He had recent labs with Dr. Brower which I will try to obtain.\par June 6, 2016. The patient returns for followup. He had an echocardiogram last week, which was unchanged from previously with significant LV dysfunction. His symptoms are about the same as previously because he still refuses to take his Lasix more than once or twice a week or only when he notices change in his weight or symptoms after eating the wrong foods.  He gets up every few hours to go to the bathroom at night, but it sounds more like prostate related than PND or orthopnea. He is short of breath when he gets back to bed. He has no chest pain. His weight has not gone down although has not increased either. His compliance with his medications and medical followup is poor. He is on Proscar but thinks he had a problem with Flomax in the past; I urged him to check with Dr. Brower, and perhaps follow up with the urologist. After reviewing his labs his potassium was too high to consider spironolactone and we decided to try digoxin\par July 19, 2016. After 3 weeks of digoxin therapy he felt even worse with more weight gain, more shortness of breath and finally stopped it. Now he feels like he is back to himself. Again, he is taking the diuretic 4-5 times a week. No nauseousness, vomiting, diarrhea, et cetera.\par November 18, 2016. Patient is here in followup. For the most part he remained stable without any change in his symptoms. On the average taking the diuretic maybe 4 days a week according to wife; patient claims 6 days per week. He still has problems getting out if he takes a diuretic. Having urinary incontinence when he stands up from a sitting position. No increase in shortness of breath. No chest pain. No change in medications or interval hospital or emergency room visits. Still post-nasal drip.\par April 7, 2017. Patient is here in followup. Pretty stable from a cardiac point of view. He had an injection in his hip about 2 weeks ago and suddenly his knee pain was gone and he is feeling much better. Weight is about the same. No change in medication. Had labs and EKG with his yearly physical with Dr. Brower in March. A bladder stone was found incidentally and is being.\par September 14, 2017. Patient was hospitalized August 21 with difficulty urinating, and shortness of breath. No evidence of CHF at that time with clear Chest x-ray. Urinary retention responded to Ballesteros placement, but he was eventually found to have large stones in his bladder, that could not be passed and he is now scheduled for an upcoming procedure and a possible TURP. His cardiac status is significant, but stable. Left ventricular ejection fraction is diminished, but no CHF on his current regimen. He did not do well on digoxin and had refused Entresto. He also refused AICD. He has not smoked since his bypass in 2012. He had a mammary artery to his LAD. There was some disease in the right and circumflex, but non-bypassable. He is now on Lasix 80 mg daily in place of 40.\par May 25, 2018. First visit since above. Had check up with Dr. Brower in April (office closed) so did not want labs or ECG. Echo done with worse LV systolic function and more MR and TR. Long discussion. Refused Entresto in past.  Agreed to Invokanna.\par June 8, 2018. Patient returns in followup. Hard to exactly pin down but feels better and thinks breathing is better, even though his weight has not gone down. Taking I believe Innvokana 150 mg and on his own deciding between 40 and 80 of Lasix daily.\par March 5, 2019. First visit since June of last year. In the interim did have banding of his hemorrhoids. Responded very well to Invokana and has diuresed between 11 and 15 pounds. Often holds Lasix, and most of the time only takes 20 mg. Would like to lose more weight. No chest pain. No other complaints. Using CPAP for her sleep apnea and sleeps like a baby

## 2019-03-07 NOTE — REASON FOR VISIT
[FreeTextEntry1] : Followup of patient with cardiomyopathy and coronary artery disease, status post bypass of his LAD.

## 2019-03-19 ENCOUNTER — MEDICATION RENEWAL (OUTPATIENT)
Age: 74
End: 2019-03-19

## 2019-08-02 ENCOUNTER — APPOINTMENT (OUTPATIENT)
Age: 74
End: 2019-08-02

## 2019-08-26 ENCOUNTER — NON-APPOINTMENT (OUTPATIENT)
Age: 74
End: 2019-08-26

## 2019-08-26 ENCOUNTER — APPOINTMENT (OUTPATIENT)
Dept: CARDIOLOGY | Facility: CLINIC | Age: 74
End: 2019-08-26
Payer: MEDICARE

## 2019-08-26 VITALS
BODY MASS INDEX: 35.94 KG/M2 | SYSTOLIC BLOOD PRESSURE: 121 MMHG | DIASTOLIC BLOOD PRESSURE: 73 MMHG | HEART RATE: 77 BPM | HEIGHT: 74 IN | OXYGEN SATURATION: 97 % | RESPIRATION RATE: 16 BRPM | WEIGHT: 280 LBS

## 2019-08-26 DIAGNOSIS — I44.7 LEFT BUNDLE-BRANCH BLOCK, UNSPECIFIED: ICD-10-CM

## 2019-08-26 PROCEDURE — 99214 OFFICE O/P EST MOD 30 MIN: CPT

## 2019-08-26 PROCEDURE — 93000 ELECTROCARDIOGRAM COMPLETE: CPT

## 2019-08-26 NOTE — CARDIOLOGY SUMMARY
[Inf Wall Defect] : inferior wall defect [Ant Wall Defect] : anterior wall defect [LVEF ___%] : LVEF [unfilled]% [Moderate] : moderate LV dysfunction [None] : no pulmonary hypertension [Enlarged] : enlarged LA size [Mild] : mild mitral regurgitation [___] : [unfilled]

## 2019-08-27 NOTE — PHYSICAL EXAM
[General Appearance - Well Developed] : well developed [Normal Appearance] : normal appearance [Well Groomed] : well groomed [General Appearance - Well Nourished] : well nourished [No Deformities] : no deformities [General Appearance - In No Acute Distress] : no acute distress [Normal Conjunctiva] : the conjunctiva exhibited no abnormalities [Eyelids - No Xanthelasma] : the eyelids demonstrated no xanthelasmas [Normal Oral Mucosa] : normal oral mucosa [No Oral Cyanosis] : no oral cyanosis [No Oral Pallor] : no oral pallor [Normal Jugular Venous A Waves Present] : normal jugular venous A waves present [Normal Jugular Venous V Waves Present] : normal jugular venous V waves present [No Jugular Venous Laura A Waves] : no jugular venous laura A waves [Exaggerated Use Of Accessory Muscles For Inspiration] : no accessory muscle use [Respiration, Rhythm And Depth] : normal respiratory rhythm and effort [Heart Rate And Rhythm] : heart rate and rhythm were normal [Murmurs] : no murmurs present [Heart Sounds] : normal S1 and S2 [Abdomen Soft] : soft [Abdomen Tenderness] : non-tender [Abnormal Walk] : normal gait [Abdomen Mass (___ Cm)] : no abdominal mass palpated [Nail Clubbing] : no clubbing of the fingernails [Petechial Hemorrhages (___cm)] : no petechial hemorrhages [Cyanosis, Localized] : no localized cyanosis [Skin Color & Pigmentation] : normal skin color and pigmentation [Skin Lesions] : no skin lesions [No Venous Stasis] : no venous stasis [] : no rash [No Xanthoma] : no  xanthoma was observed [No Skin Ulcers] : no skin ulcer [Affect] : the affect was normal [Oriented To Time, Place, And Person] : oriented to person, place, and time [Mood] : the mood was normal [No Anxiety] : not feeling anxious [FreeTextEntry1] : No edema. Pedal pulses present today left, ?right

## 2019-08-27 NOTE — REVIEW OF SYSTEMS
[Dyspnea on exertion] : dyspnea during exertion [Joint Pain] : joint pain [Joint Stiffness] : joint stiffness [see HPI] : see HPI [Numbness (Hypesthesia)] : numbness [Recent Weight Loss (___ Lbs)] : no recent weight loss [Recent Weight Gain (___ Lbs)] : no recent weight gain [Shortness Of Breath] : no shortness of breath [Chest Pain] : no chest pain [Lower Ext Edema] : no extremity edema [Leg Claudication] : no intermittent leg claudication [Palpitations] : no palpitations [Cough] : no cough [Urinary Frequency] : no change in urinary frequency [Nocturia] : no nocturia [Dizziness] : no dizziness [FreeTextEntry1] : [place] on occasion

## 2020-02-24 ENCOUNTER — APPOINTMENT (OUTPATIENT)
Dept: CARDIOLOGY | Facility: CLINIC | Age: 75
End: 2020-02-24
Payer: MEDICARE

## 2020-02-24 ENCOUNTER — NON-APPOINTMENT (OUTPATIENT)
Age: 75
End: 2020-02-24

## 2020-02-24 VITALS
SYSTOLIC BLOOD PRESSURE: 103 MMHG | HEIGHT: 74 IN | DIASTOLIC BLOOD PRESSURE: 65 MMHG | OXYGEN SATURATION: 94 % | WEIGHT: 285 LBS | BODY MASS INDEX: 36.57 KG/M2 | HEART RATE: 85 BPM

## 2020-02-24 PROCEDURE — 36415 COLL VENOUS BLD VENIPUNCTURE: CPT

## 2020-02-24 PROCEDURE — 93000 ELECTROCARDIOGRAM COMPLETE: CPT

## 2020-02-24 PROCEDURE — 99214 OFFICE O/P EST MOD 30 MIN: CPT

## 2020-02-24 NOTE — CARDIOLOGY SUMMARY
[Ant Wall Defect] : anterior wall defect [Inf Wall Defect] : inferior wall defect [LVEF ___%] : LVEF [unfilled]% [None] : no pulmonary hypertension [Moderate] : moderate LV dysfunction [Enlarged] : enlarged LA size [Mild] : mild mitral regurgitation [___] : [unfilled]

## 2020-02-25 LAB
ALBUMIN SERPL ELPH-MCNC: 4.6 G/DL
ALP BLD-CCNC: 94 U/L
ALT SERPL-CCNC: 15 U/L
ANION GAP SERPL CALC-SCNC: 15 MMOL/L
AST SERPL-CCNC: 18 U/L
BASOPHILS # BLD AUTO: 0.08 K/UL
BASOPHILS NFR BLD AUTO: 0.9 %
BILIRUB SERPL-MCNC: 0.4 MG/DL
BUN SERPL-MCNC: 32 MG/DL
CALCIUM SERPL-MCNC: 9.5 MG/DL
CHLORIDE SERPL-SCNC: 103 MMOL/L
CHOLEST SERPL-MCNC: 147 MG/DL
CHOLEST/HDLC SERPL: 3.2 RATIO
CO2 SERPL-SCNC: 23 MMOL/L
CREAT SERPL-MCNC: 1.87 MG/DL
EOSINOPHIL # BLD AUTO: 0.26 K/UL
EOSINOPHIL NFR BLD AUTO: 3 %
ESTIMATED AVERAGE GLUCOSE: 186 MG/DL
GLUCOSE SERPL-MCNC: 191 MG/DL
HBA1C MFR BLD HPLC: 8.1 %
HCT VFR BLD CALC: 47.1 %
HDLC SERPL-MCNC: 45 MG/DL
HGB BLD-MCNC: 14.2 G/DL
IMM GRANULOCYTES NFR BLD AUTO: 0.2 %
LDLC SERPL CALC-MCNC: 74 MG/DL
LYMPHOCYTES # BLD AUTO: 2.08 K/UL
LYMPHOCYTES NFR BLD AUTO: 23.9 %
MAN DIFF?: NORMAL
MCHC RBC-ENTMCNC: 29.5 PG
MCHC RBC-ENTMCNC: 30.1 GM/DL
MCV RBC AUTO: 97.9 FL
MONOCYTES # BLD AUTO: 0.64 K/UL
MONOCYTES NFR BLD AUTO: 7.4 %
NEUTROPHILS # BLD AUTO: 5.61 K/UL
NEUTROPHILS NFR BLD AUTO: 64.6 %
NT-PROBNP SERPL-MCNC: 1421 PG/ML
PLATELET # BLD AUTO: 216 K/UL
POTASSIUM SERPL-SCNC: 5.2 MMOL/L
PROT SERPL-MCNC: 6.9 G/DL
RBC # BLD: 4.81 M/UL
RBC # FLD: 15.6 %
SODIUM SERPL-SCNC: 142 MMOL/L
TRIGL SERPL-MCNC: 136 MG/DL
WBC # FLD AUTO: 8.69 K/UL

## 2020-03-04 ENCOUNTER — RX RENEWAL (OUTPATIENT)
Age: 75
End: 2020-03-04

## 2020-03-04 RX ORDER — CANAGLIFLOZIN 300 MG/1
300 TABLET, FILM COATED ORAL DAILY
Qty: 90 | Refills: 4 | Status: ACTIVE | COMMUNITY
Start: 2019-03-07 | End: 1900-01-01

## 2020-03-10 ENCOUNTER — APPOINTMENT (OUTPATIENT)
Dept: CARDIOLOGY | Facility: CLINIC | Age: 75
End: 2020-03-10
Payer: MEDICARE

## 2020-03-10 PROCEDURE — 93306 TTE W/DOPPLER COMPLETE: CPT

## 2020-03-11 NOTE — PHYSICAL EXAM
[General Appearance - Well Developed] : well developed [Normal Appearance] : normal appearance [Well Groomed] : well groomed [General Appearance - Well Nourished] : well nourished [General Appearance - In No Acute Distress] : no acute distress [No Deformities] : no deformities [Normal Conjunctiva] : the conjunctiva exhibited no abnormalities [Eyelids - No Xanthelasma] : the eyelids demonstrated no xanthelasmas [Normal Oral Mucosa] : normal oral mucosa [No Oral Pallor] : no oral pallor [No Oral Cyanosis] : no oral cyanosis [Normal Jugular Venous V Waves Present] : normal jugular venous V waves present [Normal Jugular Venous A Waves Present] : normal jugular venous A waves present [No Jugular Venous Laura A Waves] : no jugular venous laura A waves [Respiration, Rhythm And Depth] : normal respiratory rhythm and effort [Exaggerated Use Of Accessory Muscles For Inspiration] : no accessory muscle use [Heart Rate And Rhythm] : heart rate and rhythm were normal [Heart Sounds] : normal S1 and S2 [Murmurs] : no murmurs present [Abdomen Soft] : soft [Abdomen Tenderness] : non-tender [Abdomen Mass (___ Cm)] : no abdominal mass palpated [Abnormal Walk] : normal gait [Nail Clubbing] : no clubbing of the fingernails [Petechial Hemorrhages (___cm)] : no petechial hemorrhages [Cyanosis, Localized] : no localized cyanosis [Skin Color & Pigmentation] : normal skin color and pigmentation [No Venous Stasis] : no venous stasis [] : no rash [Skin Lesions] : no skin lesions [No Skin Ulcers] : no skin ulcer [No Xanthoma] : no  xanthoma was observed [Affect] : the affect was normal [Oriented To Time, Place, And Person] : oriented to person, place, and time [Mood] : the mood was normal [No Anxiety] : not feeling anxious [FreeTextEntry1] : No edema. Pedal pulses present today left, ?right

## 2020-03-11 NOTE — HISTORY OF PRESENT ILLNESS
[FreeTextEntry1] : (MED HX) Patient developed shortness of breath while trying to cut his lawn in about March or April of 2012. Gradually he developed classic PND and orthopnea and came to see me on May 3. Subsequent workup found dilated cardiomyopathy both global and segmental and led to cardiac catheterization on May 8. This revealed occluded proximal circumflex, occluded proximal right coronary artery, 75% mid LAD with a 20% ostial left main. LVEF was 20% a viability study was done on May 14 which showed significant viable myocardium. LVEF post stress with 25%. Based on these studies it was elected to try coronary artery bypass surgery to improve his LV function even though Dr. Bell of cardiology felt we were most likely dealing with a cardiomyopathy separate from his coronary disease. In any case coronary artery bypass was done in June but the right and circumflex were so small and occluded that he could only get a mammary graft into his LAD. The patient's postop course was uncomplicated except for nonsustained V. tach. The patient underwent an EP study on June 13 and was noninducible. By regulation he would have to wait 3 months after bypass for consideration of AICD therapy.  He had a followup echo in July 24 that was suboptimal and did show some improvement in the anterior and apical regions but overall still had severe global dysfunction.  He has had no episodes of dizziness or lightheadedness to suggest an  arrhythmia, and certainly no syncope or near-syncope. He had a followup MUGA on September 19, 2012 that showed a left ventricular ejection fraction of 28%. He then saw Dr. Willson of [facility] in consult who recommended  AICD but the patient  still wanted to hold off and continue on his medical therapy. His Coreg was increased to 6.25-12.5 b.i.d. and then to 25 bid in February, 2013. He has gained a lot of weight since stopping smoking. He does have mild amount of walking, mostly around the casinos, regular walking is still limited by claudication on and off. He denies chest pain, shortness of breath unless walks more than a block, palpitations, syncope or near-syncope. He said he occasionally gets a little dizzy when he starts to exercise or changing positions but not to the point of near syncope. He saw Dr. Brower on January 25, 2012 after a viral illness, possibly the flu at the end of December and the beginning of January. His labs were acceptable with a cholesterol of 188, HDL 43, LDL 65, triglycerides 400, hemoglobin A1c 6.4, and BUN 28 creatinine 1.27.\par April 23, 2013 Here for followup. His weight has continued to go up and he has developed somewhat of a postnasal drip. He has occasional edema on the right leg. He has dyspnea on exertion he does more than one block but no chest pain. He has had no syncope or near syncope or palpitations. He is tolerating the higher dose of Coreg. I again recommended AICD and I asked him to come back in a couple of months for an echocardiogram.\par February 12, 2014. Here for followup after I would not renew his medications without a visit. He never did have the echocardiogram or consider the AICD. On the whole he is doing fairly well. He never resumed smoking. His weight has leveled off. He does have shortness of breath with exertion but it is stable and he is able to walk a long hallway here without stopping and can usually get to the top of a flight of stairs without feeling too winded although he does walk slowly. He does not have any angina. He has not had any syncope or near syncope or any palpitations. His medications have not changed. He does have a chronic cough with some phlegm consistent with chronic bronchitis. He does not have a pulmonary doctor that he sees on a regular basis. His EKG today is sinus rhythm with a first-degree AV block and unchanged with nonspecific T-wave changes.\par March 11, 2014. The patient returned for followup on echocardiogram. The test was significantly suboptimal but his anteroseptal wall is akinetic and overall LV EF is around 25-30%. He promised to see Dr. Willson and discuss AICD and whether he is a candidate for a biventricular pacer when he comes back from his vacation to [place].\par January 22, 2015. The patient finally returns for followup visit when I would not renew his medications. He never made an appointment with [facility] after his vacation to [place]. He says he is "afraid of hospitals".  He has had chronic PND and orthopnea and he follows his weights very closely. If his weight is a little bit high he takes the furosemide but he was not taking it every day. On Christmas Eve he says he was fine but starting on Veronica Day he had severe shortness of breath and a cough with some phlegm. He saw Dr. Brower who placed him on Levaquin and he has had significant improvement. However he still has PND, orthopnea, and occasional edema. Since Christmas he has been on 40 mg daily of Lasix almost consistently. No exertional chest pain. He has not been able to get his weight down. No palpitations. No syncope or near syncope. His EKG is still sinus rhythm without a left bundle branch block.\par March 18, 2016. First visit since January of last year. Patient with no new complaints. Still has significant dyspnea on exertion which his wife thinks is worse.. He does have orthostatic dizziness, but he knows to wait a minute, and then he can go without any problems. No palpitations, syncope, or near-syncope.  No interim hospitalizations or medical procedures. No change in medication. We discussed the possibility of trying Entresto. He had recent labs with Dr. Brower which I will try to obtain.\par June 6, 2016. The patient returns for followup. He had an echocardiogram last week, which was unchanged from previously with significant LV dysfunction. His symptoms are about the same as previously because he still refuses to take his Lasix more than once or twice a week or only when he notices change in his weight or symptoms after eating the wrong foods.  He gets up every few hours to go to the bathroom at night, but it sounds more like prostate related than PND or orthopnea. He is short of breath when he gets back to bed. He has no chest pain. His weight has not gone down although has not increased either. His compliance with his medications and medical followup is poor. He is on Proscar but thinks he had a problem with Flomax in the past; I urged him to check with Dr. Brower, and perhaps follow up with the urologist. After reviewing his labs his potassium was too high to consider spironolactone and we decided to try digoxin\par July 19, 2016. After 3 weeks of digoxin therapy he felt even worse with more weight gain, more shortness of breath and finally stopped it. Now he feels like he is back to himself. Again, he is taking the diuretic 4-5 times a week. No nauseousness, vomiting, diarrhea, et cetera.\par November 18, 2016. Patient is here in followup. For the most part he remained stable without any change in his symptoms. On the average taking the diuretic maybe 4 days a week according to wife; patient claims 6 days per week. He still has problems getting out if he takes a diuretic. Having urinary incontinence when he stands up from a sitting position. No increase in shortness of breath. No chest pain. No change in medications or interval hospital or emergency room visits. Still post-nasal drip.\par April 7, 2017. Patient is here in followup. Pretty stable from a cardiac point of view. He had an injection in his hip about 2 weeks ago and suddenly his knee pain was gone and he is feeling much better. Weight is about the same. No change in medication. Had labs and EKG with his yearly physical with Dr. Brower in March. A bladder stone was found incidentally and is being.\par September 14, 2017. Patient was hospitalized August 21 with difficulty urinating, and shortness of breath. No evidence of CHF at that time with clear Chest x-ray. Urinary retention responded to Ballesteros placement, but he was eventually found to have large stones in his bladder, that could not be passed and he is now scheduled for an upcoming procedure and a possible TURP. His cardiac status is significant, but stable. Left ventricular ejection fraction is diminished, but no CHF on his current regimen. He did not do well on digoxin and had refused Entresto. He also refused AICD. He has not smoked since his bypass in 2012. He had a mammary artery to his LAD. There was some disease in the right and circumflex, but non-bypassable. He is now on Lasix 80 mg daily in place of 40.\par May 25, 2018. First visit since above. Had check up with Dr. Brower in April (office closed) so did not want labs or ECG. Echo done with worse LV systolic function and more MR and TR. Long discussion. Refused Entresto in past.  Agreed to Invokanna.\par June 8, 2018. Patient returns in followup. Hard to exactly pin down but feels better and thinks breathing is better, even though his weight has not gone down. Taking I believe Innvokana 150 mg and on his own deciding between 40 and 80 of Lasix daily.\par March 5, 2019. First visit since June of last year. In the interim did have banding of his hemorrhoids. Responded very well to Invokana and has diuresed between 11 and 15 pounds. Often holds Lasix, and most of the time only takes 20 mg. Would like to lose more weight. No chest pain. No other complaints. Using CPAP for her sleep apnea and sleeps like a baby.\par August 26, 2019.  Patient returns in follow-up.  Had labs with Dr. Brower.  Remains in sinus rhythm at 71 with left bundle branch block.. Still refusing AICD/biventricular pacemaker.  Says he has more problems with chest tightness and fatigue in his legs than true shortness of breath.  Doing better with the CPAP.  Because of the Invokana he only takes the Lasix every 2 or 3 days sometimes even less and he has no edema.\par February 24, 2020.  Patient returns in follow-up along with his wife.  Ever since starting Invokana he has done great, so much so that he decided to try to stop the Lasix totally but he realized he was getting out of breath going up stairs and getting some edema so he went back to the Lasix.  He is nervous about taking it every day because of his kidney function.  Back in March of last year his creatinine had gone up to 1.9 but in August with his internist it was down to 1.6 again.  He has taken it for the last few days but usually takes it a few times a week only.  No other change in medication.  Still not interested in hearing about AICD or biventricular pacer.  Does have at least moderate MR almost 2 years ago on echo; perhaps would be a candidate for a clip.\par March 11, 2020.  Patient came for echocardiogram yesterday.  Echo virtually unchanged in terms of LV function and it was a very suboptimal study.  Only minimal MR detected.  Patient does seem to worsen he might need a transesophageal echo to rule out more significant MR.

## 2020-03-11 NOTE — REVIEW OF SYSTEMS
[Dyspnea on exertion] : dyspnea during exertion [Joint Pain] : joint pain [Joint Stiffness] : joint stiffness [see HPI] : see HPI [Numbness (Hypesthesia)] : numbness [Recent Weight Gain (___ Lbs)] : recent [unfilled] ~Ulb weight gain [Lower Ext Edema] : lower extremity edema [Recent Weight Loss (___ Lbs)] : no recent weight loss [Shortness Of Breath] : no shortness of breath [Chest Pain] : no chest pain [Leg Claudication] : no intermittent leg claudication [Palpitations] : no palpitations [Cough] : no cough [Urinary Frequency] : no change in urinary frequency [Nocturia] : no nocturia [Dizziness] : no dizziness [FreeTextEntry1] : Flushing on occasion

## 2020-07-17 NOTE — HISTORY OF PRESENT ILLNESS
[address]                 [address] [address]                               PULMONARY FUNCTION    PATIENT NAME: Mandy Maza                  :        1990  MED REC NO:   [id number]                          ROOM:  ACCOUNT NO:   [de-identified]                           ADMIT DATE: 07/15/2020  PROVIDER:     Isaiah Sevilla MD    DATE OF PROCEDURE:  07/15/2020    INDICATION:  Dyspnea. FINDINGS:  1. Spirometry revealed no evidence of obstructive defect. FEV1 is 5.27  liters, which is 103% of predicted. No significant response to  bronchodilators. FEV1/FVC ratio of 84%. 2.  Lung volumes revealed normal total lung capacity of 8.55 liters,  which is 103% of predicted. 3.  Diffusion capacity is moderately decreased at 24.25, which is 59% of  predicted. 4.  Flow volume loops appeared to be normal.  5.  Six-minute walk was done per [facility] protocol. The  patient was able to walk 1260 feet. Saturation on room air at rest was  96% with a heart rate of 100. No significant desaturation on exertion. Max heart rate of 122. CONCLUSION:  1. No evidence of obstructive defect or restrictive defect. 2.  No bronchodilator response. 3.  Moderately decreased diffusion capacity that could be seen in  emphysema, early interstitial lung disease, anemia, and pulmonary  vascular disease. 4.  Six-minute walk with no significant desaturation. 5.  Resting tachycardia worsened on exertion, maximum heart rate of 122.         Doni Puga MD    D: 2020 15:41:32       T: 2020 17:07:14     [id number]  Job#: [id number]     Doc#: [id number]    CC:
[FreeTextEntry1] : (MED HX) Patient developed shortness of breath while trying to cut his lawn in about March or April of 2012. Gradually he developed classic PND and orthopnea and came to see me on May 3. Subsequent workup found dilated cardiomyopathy both global and segmental and led to cardiac catheterization on May 8. This revealed occluded proximal circumflex, occluded proximal right coronary artery, 75% mid LAD with a 20% ostial left main. LVEF was 20% a viability study was done on May 14 which showed significant viable myocardium. LVEF post stress with 25%. Based on these studies it was elected to try coronary artery bypass surgery to improve his LV function even though Dr. Bell of cardiology felt we were most likely dealing with a cardiomyopathy separate from his coronary disease. In any case coronary artery bypass was done in June but the right and circumflex were so small and occluded that he could only get a mammary graft into his LAD. The patient's postop course was uncomplicated except for nonsustained V. tach. The patient underwent an EP study on June 13 and was noninducible. By regulation he would have to wait 3 months after bypass for consideration of AICD therapy.  He had a followup echo in July 24 that was suboptimal and did show some improvement in the anterior and apical regions but overall still had severe global dysfunction.  He has had no episodes of dizziness or lightheadedness to suggest an  arrhythmia, and certainly no syncope or near-syncope. He had a followup MUGA on September 19, 2012 that showed a left ventricular ejection fraction of 28%. He then saw Dr. Willson of [facility] in consult who recommended  AICD but the patient  still wanted to hold off and continue on his medical therapy. His Coreg was increased to 6.25-12.5 b.i.d. and then to 25 bid in February, 2013. He has gained a lot of weight since stopping smoking. He does have mild amount of walking, mostly around the casinos, regular walking is still limited by claudication on and off. He denies chest pain, shortness of breath unless walks more than a block, palpitations, syncope or near-syncope. He said he occasionally gets a little dizzy when he starts to exercise or changing positions but not to the point of near syncope. He saw Dr. Brower on January 25, 2012 after a viral illness, possibly the flu at the end of December and the beginning of January. His labs were acceptable with a cholesterol of 188, HDL 43, LDL 65, triglycerides 400, hemoglobin A1c 6.4, and BUN 28 creatinine 1.27.\par April 23, 2013 Here for followup. His weight has continued to go up and he has developed somewhat of a postnasal drip. He has occasional edema on the right leg. He has dyspnea on exertion he does more than one block but no chest pain. He has had no syncope or near syncope or palpitations. He is tolerating the higher dose of Coreg. I again recommended AICD and I asked him to come back in a couple of months for an echocardiogram.\par February 12, 2014. Here for followup after I would not renew his medications without a visit. He never did have the echocardiogram or consider the AICD. On the whole he is doing fairly well. He never resumed smoking. His weight has leveled off. He does have shortness of breath with exertion but it is stable and he is able to walk a long hallway here without stopping and can usually get to the top of a flight of stairs without feeling too winded although he does walk slowly. He does not have any angina. He has not had any syncope or near syncope or any palpitations. His medications have not changed. He does have a chronic cough with some phlegm consistent with chronic bronchitis. He does not have a pulmonary doctor that he sees on a regular basis. His EKG today is sinus rhythm with a first-degree AV block and unchanged with nonspecific T-wave changes.\par March 11, 2014. The patient returned for followup on echocardiogram. The test was significantly suboptimal but his anteroseptal wall is akinetic and overall LV EF is around 25-30%. He promised to see Dr. Willson and discuss AICD and whether he is a candidate for a biventricular pacer when he comes back from his vacation to [place].\par January 22, 2015. The patient finally returns for followup visit when I would not renew his medications. He never made an appointment with [facility] after his vacation to [place]. He says he is "afraid of hospitals".  He has had chronic PND and orthopnea and he follows his weights very closely. If his weight is a little bit high he takes the furosemide but he was not taking it every day. On Christmas Eve he says he was fine but starting on Veronica Day he had severe shortness of breath and a cough with some phlegm. He saw Dr. Brower who placed him on Levaquin and he has had significant improvement. However he still has PND, orthopnea, and occasional edema. Since Christmas he has been on 40 mg daily of Lasix almost consistently. No exertional chest pain. He has not been able to get his weight down. No palpitations. No syncope or near syncope. His EKG is still sinus rhythm without a left bundle branch block.\par March 18, 2016. First visit since January of last year. Patient with no new complaints. Still has significant dyspnea on exertion which his wife thinks is worse.. He does have orthostatic dizziness, but he knows to wait a minute, and then he can go without any problems. No palpitations, syncope, or near-syncope.  No interim hospitalizations or medical procedures. No change in medication. We discussed the possibility of trying Entresto. He had recent labs with Dr. Brower which I will try to obtain.\par June 6, 2016. The patient returns for followup. He had an echocardiogram last week, which was unchanged from previously with significant LV dysfunction. His symptoms are about the same as previously because he still refuses to take his Lasix more than once or twice a week or only when he notices change in his weight or symptoms after eating the wrong foods.  He gets up every few hours to go to the bathroom at night, but it sounds more like prostate related than PND or orthopnea. He is short of breath when he gets back to bed. He has no chest pain. His weight has not gone down although has not increased either. His compliance with his medications and medical followup is poor. He is on Proscar but thinks he had a problem with Flomax in the past; I urged him to check with Dr. Brower, and perhaps follow up with the urologist. After reviewing his labs his potassium was too high to consider spironolactone and we decided to try digoxin\par July 19, 2016. After 3 weeks of digoxin therapy he felt even worse with more weight gain, more shortness of breath and finally stopped it. Now he feels like he is back to himself. Again, he is taking the diuretic 4-5 times a week. No nauseousness, vomiting, diarrhea, et cetera.\par November 18, 2016. Patient is here in followup. For the most part he remained stable without any change in his symptoms. On the average taking the diuretic maybe 4 days a week according to wife; patient claims 6 days per week. He still has problems getting out if he takes a diuretic. Having urinary incontinence when he stands up from a sitting position. No increase in shortness of breath. No chest pain. No change in medications or interval hospital or emergency room visits. Still post-nasal drip.\par April 7, 2017. Patient is here in followup. Pretty stable from a cardiac point of view. He had an injection in his hip about 2 weeks ago and suddenly his knee pain was gone and he is feeling much better. Weight is about the same. No change in medication. Had labs and EKG with his yearly physical with Dr. Brower in March. A bladder stone was found incidentally and is being.\par September 14, 2017. Patient was hospitalized August 21 with difficulty urinating, and shortness of breath. No evidence of CHF at that time with clear Chest x-ray. Urinary retention responded to Ballesteros placement, but he was eventually found to have large stones in his bladder, that could not be passed and he is now scheduled for an upcoming procedure and a possible TURP. His cardiac status is significant, but stable. Left ventricular ejection fraction is diminished, but no CHF on his current regimen. He did not do well on digoxin and had refused Entresto. He also refused AICD. He has not smoked since his bypass in 2012. He had a mammary artery to his LAD. There was some disease in the right and circumflex, but non-bypassable. He is now on Lasix 80 mg daily in place of 40.\par May 25, 2018. First visit since above. Had check up with Dr. Brower in April (office closed) so did not want labs or ECG. Echo done with worse LV systolic function and more MR and TR. Long discussion. Refused Entresto in past.  Agreed to Invokanna.\par June 8, 2018. Patient returns in followup. Hard to exactly pin down but feels better and thinks breathing is better, even though his weight has not gone down. Taking I believe Innvokana 150 mg and on his own deciding between 40 and 80 of Lasix daily.\par March 5, 2019. First visit since June of last year. In the interim did have banding of his hemorrhoids. Responded very well to Invokana and has diuresed between 11 and 15 pounds. Often holds Lasix, and most of the time only takes 20 mg. Would like to lose more weight. No chest pain. No other complaints. Using CPAP for her sleep apnea and sleeps like a baby.\par August 26, 2019.  Patient returns in follow-up.  Had labs with Dr. Brower.  Remains in sinus rhythm at 71 with left bundle branch block.. Still refusing AICD/biventricular pacemaker.  Says he has more problems with chest tightness and fatigue in his legs than true shortness of breath.  Doing better with the CPAP.  Because of the Invokana he only takes the Lasix every 2 or 3 days sometimes even less and he has no edema

## 2021-01-01 ENCOUNTER — APPOINTMENT (OUTPATIENT)
Dept: CARDIOLOGY | Facility: CLINIC | Age: 76
End: 2021-01-01
Payer: MEDICARE

## 2021-01-01 ENCOUNTER — NON-APPOINTMENT (OUTPATIENT)
Age: 76
End: 2021-01-01

## 2021-01-01 ENCOUNTER — RX RENEWAL (OUTPATIENT)
Age: 76
End: 2021-01-01

## 2021-01-01 VITALS — BODY MASS INDEX: 35.31 KG/M2 | WEIGHT: 275 LBS

## 2021-01-01 VITALS
HEIGHT: 74 IN | TEMPERATURE: 98.1 F | WEIGHT: 280 LBS | HEART RATE: 66 BPM | DIASTOLIC BLOOD PRESSURE: 71 MMHG | BODY MASS INDEX: 35.94 KG/M2 | SYSTOLIC BLOOD PRESSURE: 119 MMHG | OXYGEN SATURATION: 95 % | RESPIRATION RATE: 16 BRPM

## 2021-01-01 VITALS — OXYGEN SATURATION: 92 % | HEART RATE: 105 BPM | DIASTOLIC BLOOD PRESSURE: 68 MMHG | SYSTOLIC BLOOD PRESSURE: 112 MMHG

## 2021-01-01 DIAGNOSIS — I50.22 CHRONIC SYSTOLIC (CONGESTIVE) HEART FAILURE: ICD-10-CM

## 2021-01-01 DIAGNOSIS — I34.0 NONRHEUMATIC MITRAL (VALVE) INSUFFICIENCY: ICD-10-CM

## 2021-01-01 DIAGNOSIS — E11.9 TYPE 2 DIABETES MELLITUS W/OUT COMPLICATIONS: ICD-10-CM

## 2021-01-01 DIAGNOSIS — I25.10 ATHEROSCLEROTIC HEART DISEASE OF NATIVE CORONARY ARTERY W/OUT ANGINA PECTORIS: ICD-10-CM

## 2021-01-01 PROCEDURE — 93306 TTE W/DOPPLER COMPLETE: CPT

## 2021-01-01 PROCEDURE — 93000 ELECTROCARDIOGRAM COMPLETE: CPT

## 2021-01-01 PROCEDURE — 99214 OFFICE O/P EST MOD 30 MIN: CPT | Mod: 25

## 2021-01-01 PROCEDURE — 99072 ADDL SUPL MATRL&STAF TM PHE: CPT

## 2021-01-01 PROCEDURE — 99215 OFFICE O/P EST HI 40 MIN: CPT

## 2021-01-01 RX ORDER — SACUBITRIL AND VALSARTAN 49; 51 MG/1; MG/1
49-51 TABLET, FILM COATED ORAL
Qty: 60 | Refills: 1 | Status: ACTIVE | COMMUNITY
Start: 2021-01-01 | End: 1900-01-01

## 2021-05-14 NOTE — REVIEW OF SYSTEMS
[Feeling Fatigued] : feeling fatigued [Weight Loss (___ Lbs)] : [unfilled] ~Ulb weight loss [Dyspnea on exertion] : dyspnea during exertion [Negative] : Heme/Lymph [Chest Discomfort] : no chest discomfort [Lower Ext Edema] : no extremity edema [Palpitations] : no palpitations [Syncope] : no syncope

## 2021-05-14 NOTE — HISTORY OF PRESENT ILLNESS
[FreeTextEntry1] : (MED HX) Patient developed shortness of breath while trying to cut his lawn in about March or April of 2012. Gradually he developed classic PND and orthopnea and came to see me on May 3. Subsequent workup found dilated cardiomyopathy both global and segmental and led to cardiac catheterization on May 8. This revealed occluded proximal circumflex, occluded proximal right coronary artery, 75% mid LAD with a 20% ostial left main. LVEF was 20% a viability study was done on May 14 which showed significant viable myocardium. LVEF post stress with 25%. Based on these studies it was elected to try coronary artery bypass surgery to improve his LV function even though Dr. Bell of cardiology felt we were most likely dealing with a cardiomyopathy separate from his coronary disease. In any case coronary artery bypass was done in June but the right and circumflex were so small and occluded that he could only get a mammary graft into his LAD. The patient's postop course was uncomplicated except for nonsustained V. tach. The patient underwent an EP study on June 13 and was noninducible. By regulation he would have to wait 3 months after bypass for consideration of AICD therapy.  He had a followup echo in July 24 that was suboptimal and did show some improvement in the anterior and apical regions but overall still had severe global dysfunction.  He has had no episodes of dizziness or lightheadedness to suggest an  arrhythmia, and certainly no syncope or near-syncope. He had a followup MUGA on September 19, 2012 that showed a left ventricular ejection fraction of 28%. He then saw Dr. Willson of [facility] in consult who recommended  AICD but the patient  still wanted to hold off and continue on his medical therapy. His Coreg was increased to 6.25-12.5 b.i.d. and then to 25 bid in February, 2013. He has gained a lot of weight since stopping smoking. He does have mild amount of walking, mostly around the casinos, regular walking is still limited by claudication on and off. He denies chest pain, shortness of breath unless walks more than a block, palpitations, syncope or near-syncope. He said he occasionally gets a little dizzy when he starts to exercise or changing positions but not to the point of near syncope. He saw Dr. Brower on January 25, 2012 after a viral illness, possibly the flu at the end of December and the beginning of January. His labs were acceptable with a cholesterol of 188, HDL 43, LDL 65, triglycerides 400, hemoglobin A1c 6.4, and BUN 28 creatinine 1.27.\par April 23, 2013 Here for followup. His weight has continued to go up and he has developed somewhat of a postnasal drip. He has occasional edema on the right leg. He has dyspnea on exertion he does more than one block but no chest pain. He has had no syncope or near syncope or palpitations. He is tolerating the higher dose of Coreg. I again recommended AICD and I asked him to come back in a couple of months for an echocardiogram.\par February 12, 2014. Here for followup after I would not renew his medications without a visit. He never did have the echocardiogram or consider the AICD. On the whole he is doing fairly well. He never resumed smoking. His weight has leveled off. He does have shortness of breath with exertion but it is stable and he is able to walk a long hallway here without stopping and can usually get to the top of a flight of stairs without feeling too winded although he does walk slowly. He does not have any angina. He has not had any syncope or near syncope or any palpitations. His medications have not changed. He does have a chronic cough with some phlegm consistent with chronic bronchitis. He does not have a pulmonary doctor that he sees on a regular basis. His EKG today is sinus rhythm with a first-degree AV block and unchanged with nonspecific T-wave changes.\par March 11, 2014. The patient returned for followup on echocardiogram. The test was significantly suboptimal but his anteroseptal wall is akinetic and overall LV EF is around 25-30%. He promised to see Dr. Willson and discuss AICD and whether he is a candidate for a biventricular pacer when he comes back from his vacation to [place].\par January 22, 2015. The patient finally returns for followup visit when I would not renew his medications. He never made an appointment with [facility] after his vacation to [place]. He says he is "afraid of hospitals".  He has had chronic PND and orthopnea and he follows his weights very closely. If his weight is a little bit high he takes the furosemide but he was not taking it every day. On Christmas Eve he says he was fine but starting on Veronica Day he had severe shortness of breath and a cough with some phlegm. He saw Dr. Brower who placed him on Levaquin and he has had significant improvement. However he still has PND, orthopnea, and occasional edema. Since Christmas he has been on 40 mg daily of Lasix almost consistently. No exertional chest pain. He has not been able to get his weight down. No palpitations. No syncope or near syncope. His EKG is still sinus rhythm without a left bundle branch block.\par March 18, 2016. First visit since January of last year. Patient with no new complaints. Still has significant dyspnea on exertion which his wife thinks is worse.. He does have orthostatic dizziness, but he knows to wait a minute, and then he can go without any problems. No palpitations, syncope, or near-syncope.  No interim hospitalizations or medical procedures. No change in medication. We discussed the possibility of trying Entresto. He had recent labs with Dr. Brower which I will try to obtain.\par June 6, 2016. The patient returns for followup. He had an echocardiogram last week, which was unchanged from previously with significant LV dysfunction. His symptoms are about the same as previously because he still refuses to take his Lasix more than once or twice a week or only when he notices change in his weight or symptoms after eating the wrong foods.  He gets up every few hours to go to the bathroom at night, but it sounds more like prostate related than PND or orthopnea. He is short of breath when he gets back to bed. He has no chest pain. His weight has not gone down although has not increased either. His compliance with his medications and medical followup is poor. He is on Proscar but thinks he had a problem with Flomax in the past; I urged him to check with Dr. Brower, and perhaps follow up with the urologist. After reviewing his labs his potassium was too high to consider spironolactone and we decided to try digoxin\par July 19, 2016. After 3 weeks of digoxin therapy he felt even worse with more weight gain, more shortness of breath and finally stopped it. Now he feels like he is back to himself. Again, he is taking the diuretic 4-5 times a week. No nauseousness, vomiting, diarrhea, et cetera.\par November 18, 2016. Patient is here in followup. For the most part he remained stable without any change in his symptoms. On the average taking the diuretic maybe 4 days a week according to wife; patient claims 6 days per week. He still has problems getting out if he takes a diuretic. Having urinary incontinence when he stands up from a sitting position. No increase in shortness of breath. No chest pain. No change in medications or interval hospital or emergency room visits. Still post-nasal drip.\par April 7, 2017. Patient is here in followup. Pretty stable from a cardiac point of view. He had an injection in his hip about 2 weeks ago and suddenly his knee pain was gone and he is feeling much better. Weight is about the same. No change in medication. Had labs and EKG with his yearly physical with Dr. Brower in March. A bladder stone was found incidentally and is being.\par September 14, 2017. Patient was hospitalized August 21 with difficulty urinating, and shortness of breath. No evidence of CHF at that time with clear Chest x-ray. Urinary retention responded to Ballesteros placement, but he was eventually found to have large stones in his bladder, that could not be passed and he is now scheduled for an upcoming procedure and a possible TURP. His cardiac status is significant, but stable. Left ventricular ejection fraction is diminished, but no CHF on his current regimen. He did not do well on digoxin and had refused Entresto. He also refused AICD. He has not smoked since his bypass in 2012. He had a mammary artery to his LAD. There was some disease in the right and circumflex, but non-bypassable. He is now on Lasix 80 mg daily in place of 40.\par May 25, 2018. First visit since above. Had check up with Dr. Brower in April (office closed) so did not want labs or ECG. Echo done with worse LV systolic function and more MR and TR. Long discussion. Refused Entresto in past.  Agreed to Invokanna.\par June 8, 2018. Patient returns in followup. Hard to exactly pin down but feels better and thinks breathing is better, even though his weight has not gone down. Taking I believe Innvokana 150 mg and on his own deciding between 40 and 80 of Lasix daily.\par March 5, 2019. First visit since June of last year. In the interim did have banding of his hemorrhoids. Responded very well to Invokana and has diuresed between 11 and 15 pounds. Often holds Lasix, and most of the time only takes 20 mg. Would like to lose more weight. No chest pain. No other complaints. Using CPAP for her sleep apnea and sleeps like a baby.\par August 26, 2019.  Patient returns in follow-up.  Had labs with Dr. Brower.  Remains in sinus rhythm at 71 with left bundle branch block.. Still refusing AICD/biventricular pacemaker.  Says he has more problems with chest tightness and fatigue in his legs than true shortness of breath.  Doing better with the CPAP.  Because of the Invokana he only takes the Lasix every 2 or 3 days sometimes even less and he has no edema.\par February 24, 2020.  Patient returns in follow-up along with his wife.  Ever since starting Invokana he has done great, so much so that he decided to try to stop the Lasix totally but he realized he was getting out of breath going up stairs and getting some edema so he went back to the Lasix.  He is nervous about taking it every day because of his kidney function.  Back in March of last year his creatinine had gone up to 1.9 but in August with his internist it was down to 1.6 again.  He has taken it for the last few days but usually takes it a few times a week only.  No other change in medication.  Still not interested in hearing about AICD or biventricular pacer.  Does have at least moderate MR almost 2 years ago on echo; perhaps would be a candidate for a clip.\par March 11, 2020.  Patient came for echocardiogram yesterday.  Echo virtually unchanged in terms of LV function and it was a very suboptimal study.  Only minimal MR detected.  Patient does seem to worsen he might need a transesophageal echo to rule out more significant MR.\par May 14th 2021.  First visit in over a year.  On the one hand fairly stable but he does admit to feeling very limited in what he can do.  He is much happier with the Invokana and was able to cut down on his diuretic a lot.  His blood pressure is excellent and his weight is down 5 pounds.  He had labs with his internist Dr. Brower on April 2.  Normal CBC.  Hemoglobin A1c 9.2.  BUN 28 with creatinine 1.87 and potassium 5.3.  Cholesterol 148, triglycerides 247, HDL 43, LDL 71.  Normal thyroid function.  EKG is unchanged.  After reviewing his medicine and long discussion with patient again now he seems to be willing to try an Entresto in place of his low-dose Cozaar.  We will start him on 49/51 and he will be seeing Dr. Brower in about 6 weeks and labs can be checked then.

## 2021-05-14 NOTE — PHYSICAL EXAM
[General Appearance - Well Developed] : well developed [Normal Appearance] : normal appearance [Well Groomed] : well groomed [General Appearance - Well Nourished] : well nourished [No Deformities] : no deformities [General Appearance - In No Acute Distress] : no acute distress [Normal Conjunctiva] : the conjunctiva exhibited no abnormalities [Eyelids - No Xanthelasma] : the eyelids demonstrated no xanthelasmas [Normal Oral Mucosa] : normal oral mucosa [No Oral Pallor] : no oral pallor [No Oral Cyanosis] : no oral cyanosis [Normal Jugular Venous A Waves Present] : normal jugular venous A waves present [Normal Jugular Venous V Waves Present] : normal jugular venous V waves present [No Jugular Venous Laura A Waves] : no jugular venous laura A waves [Respiration, Rhythm And Depth] : normal respiratory rhythm and effort [Exaggerated Use Of Accessory Muscles For Inspiration] : no accessory muscle use [Heart Rate And Rhythm] : heart rate and rhythm were normal [Heart Sounds] : normal S1 and S2 [Murmurs] : no murmurs present [Abdomen Soft] : soft [Abdomen Tenderness] : non-tender [Abdomen Mass (___ Cm)] : no abdominal mass palpated [Abnormal Walk] : normal gait [Nail Clubbing] : no clubbing of the fingernails [Cyanosis, Localized] : no localized cyanosis [Petechial Hemorrhages (___cm)] : no petechial hemorrhages [Skin Color & Pigmentation] : normal skin color and pigmentation [] : no rash [No Venous Stasis] : no venous stasis [Skin Lesions] : no skin lesions [No Skin Ulcers] : no skin ulcer [No Xanthoma] : no  xanthoma was observed [Oriented To Time, Place, And Person] : oriented to person, place, and time [Affect] : the affect was normal [No Anxiety] : not feeling anxious [Mood] : the mood was normal [FreeTextEntry1] : No edema. Pedal pulses present today left, ?right

## 2021-09-15 NOTE — H&P ADULT - PROBLEM SELECTOR PLAN 5
Patient teaching on [facility] [address] performed. Patient demonstrated back, all questions were answered and patient verbalized understanding.  TASHA Monitor on tele   C/W Aspirin, Crestor Monitor on tele   C/W Aspirin, Crestor  Hold ARB for now  Hold Lasix for now in setting of JHON. Reassess reinitiation in AM

## 2021-11-05 PROBLEM — E11.9 CONTROLLED TYPE 2 DIABETES MELLITUS WITHOUT COMPLICATION: Status: ACTIVE | Noted: 2017-08-28

## 2021-11-05 PROBLEM — I34.0 NON-RHEUMATIC MITRAL REGURGITATION: Status: ACTIVE | Noted: 2018-05-27

## 2021-11-05 NOTE — CARDIOLOGY SUMMARY
[Ant Wall Defect] : anterior wall defect [Inf Wall Defect] : inferior wall defect [LVEF ___%] : LVEF [unfilled]% [Moderate] : moderate LV dysfunction [None] : no pulmonary hypertension [Enlarged] : enlarged LA size [Mild] : mild mitral regurgitation [___] : [unfilled] [___] : [unfilled]

## 2021-11-05 NOTE — PHYSICAL EXAM
[General Appearance - Well Developed] : well developed [Normal Appearance] : normal appearance [Well Groomed] : well groomed [General Appearance - Well Nourished] : well nourished [No Deformities] : no deformities [General Appearance - In No Acute Distress] : no acute distress [Normal Conjunctiva] : the conjunctiva exhibited no abnormalities [Eyelids - No Xanthelasma] : the eyelids demonstrated no xanthelasmas [Normal Oral Mucosa] : normal oral mucosa [No Oral Pallor] : no oral pallor [No Oral Cyanosis] : no oral cyanosis [Normal Jugular Venous A Waves Present] : normal jugular venous A waves present [Normal Jugular Venous V Waves Present] : normal jugular venous V waves present [No Jugular Venous Laura A Waves] : no jugular venous laura A waves [Respiration, Rhythm And Depth] : normal respiratory rhythm and effort [Exaggerated Use Of Accessory Muscles For Inspiration] : no accessory muscle use [Heart Rate And Rhythm] : heart rate and rhythm were normal [Heart Sounds] : normal S1 and S2 [Murmurs] : no murmurs present [Abdomen Soft] : soft [Abdomen Tenderness] : non-tender [Abdomen Mass (___ Cm)] : no abdominal mass palpated [Abnormal Walk] : normal gait [Nail Clubbing] : no clubbing of the fingernails [Cyanosis, Localized] : no localized cyanosis [Petechial Hemorrhages (___cm)] : no petechial hemorrhages [Skin Color & Pigmentation] : normal skin color and pigmentation [] : no rash [No Venous Stasis] : no venous stasis [Skin Lesions] : no skin lesions [No Skin Ulcers] : no skin ulcer [No Xanthoma] : no  xanthoma was observed [Oriented To Time, Place, And Person] : oriented to person, place, and time [Affect] : the affect was normal [Mood] : the mood was normal [No Anxiety] : not feeling anxious [FreeTextEntry1] : No edema. Pedal pulses present today left, ?right

## 2021-11-05 NOTE — REVIEW OF SYSTEMS
[Dyspnea on exertion] : dyspnea during exertion [Negative] : Heme/Lymph [Feeling Fatigued] : not feeling fatigued [Weight Loss (___ Lbs)] : [unfilled] ~Ulb weight loss [Chest Discomfort] : no chest discomfort [Lower Ext Edema] : no extremity edema [Palpitations] : no palpitations [Syncope] : no syncope

## 2021-11-05 NOTE — HISTORY OF PRESENT ILLNESS
[FreeTextEntry1] : (MED HX) Patient developed shortness of breath while trying to cut his lawn in about March or April of 2012. Gradually he developed classic PND and orthopnea and came to see me on May 3. Subsequent workup found dilated cardiomyopathy both global and segmental and led to cardiac catheterization on May 8. This revealed occluded proximal circumflex, occluded proximal right coronary artery, 75% mid LAD with a 20% ostial left main. LVEF was 20% a viability study was done on May 14 which showed significant viable myocardium. LVEF post stress with 25%. Based on these studies it was elected to try coronary artery bypass surgery to improve his LV function even though Dr. Bell of cardiology felt we were most likely dealing with a cardiomyopathy separate from his coronary disease. In any case coronary artery bypass was done in June but the right and circumflex were so small and occluded that he could only get a mammary graft into his LAD. The patient's postop course was uncomplicated except for nonsustained V. tach. The patient underwent an EP study on June 13 and was noninducible. By regulation he would have to wait 3 months after bypass for consideration of AICD therapy.  He had a followup echo in July 24 that was suboptimal and did show some improvement in the anterior and apical regions but overall still had severe global dysfunction.  He has had no episodes of dizziness or lightheadedness to suggest an  arrhythmia, and certainly no syncope or near-syncope. He had a followup MUGA on September 19, 2012 that showed a left ventricular ejection fraction of 28%. He then saw Dr. Willson of [facility] in consult who recommended  AICD but the patient  still wanted to hold off and continue on his medical therapy. His Coreg was increased to 6.25-12.5 b.i.d. and then to 25 bid in February, 2013. He has gained a lot of weight since stopping smoking. He does have mild amount of walking, mostly around the casinos, regular walking is still limited by claudication on and off. He denies chest pain, shortness of breath unless walks more than a block, palpitations, syncope or near-syncope. He said he occasionally gets a little dizzy when he starts to exercise or changing positions but not to the point of near syncope. He saw Dr. Brower on January 25, 2012 after a viral illness, possibly the flu at the end of December and the beginning of January. His labs were acceptable with a cholesterol of 188, HDL 43, LDL 65, triglycerides 400, hemoglobin A1c 6.4, and BUN 28 creatinine 1.27.\par April 23, 2013 Here for followup. His weight has continued to go up and he has developed somewhat of a postnasal drip. He has occasional edema on the right leg. He has dyspnea on exertion he does more than one block but no chest pain. He has had no syncope or near syncope or palpitations. He is tolerating the higher dose of Coreg. I again recommended AICD and I asked him to come back in a couple of months for an echocardiogram.\par February 12, 2014. Here for followup after I would not renew his medications without a visit. He never did have the echocardiogram or consider the AICD. On the whole he is doing fairly well. He never resumed smoking. His weight has leveled off. He does have shortness of breath with exertion but it is stable and he is able to walk a long hallway here without stopping and can usually get to the top of a flight of stairs without feeling too winded although he does walk slowly. He does not have any angina. He has not had any syncope or near syncope or any palpitations. His medications have not changed. He does have a chronic cough with some phlegm consistent with chronic bronchitis. He does not have a pulmonary doctor that he sees on a regular basis. His EKG today is sinus rhythm with a first-degree AV block and unchanged with nonspecific T-wave changes.\par March 11, 2014. The patient returned for followup on echocardiogram. The test was significantly suboptimal but his anteroseptal wall is akinetic and overall LV EF is around 25-30%. He promised to see Dr. Willson and discuss AICD and whether he is a candidate for a biventricular pacer when he comes back from his vacation to [place].\par January 22, 2015. The patient finally returns for followup visit when I would not renew his medications. He never made an appointment with [facility] after his vacation to [place]. He says he is "afraid of hospitals".  He has had chronic PND and orthopnea and he follows his weights very closely. If his weight is a little bit high he takes the furosemide but he was not taking it every day. On Christmas Eve he says he was fine but starting on Veronica Day he had severe shortness of breath and a cough with some phlegm. He saw Dr. Brower who placed him on Levaquin and he has had significant improvement. However he still has PND, orthopnea, and occasional edema. Since Christmas he has been on 40 mg daily of Lasix almost consistently. No exertional chest pain. He has not been able to get his weight down. No palpitations. No syncope or near syncope. His EKG is still sinus rhythm without a left bundle branch block.\par March 18, 2016. First visit since January of last year. Patient with no new complaints. Still has significant dyspnea on exertion which his wife thinks is worse.. He does have orthostatic dizziness, but he knows to wait a minute, and then he can go without any problems. No palpitations, syncope, or near-syncope.  No interim hospitalizations or medical procedures. No change in medication. We discussed the possibility of trying Entresto. He had recent labs with Dr. Brower which I will try to obtain.\par June 6, 2016. The patient returns for followup. He had an echocardiogram last week, which was unchanged from previously with significant LV dysfunction. His symptoms are about the same as previously because he still refuses to take his Lasix more than once or twice a week or only when he notices change in his weight or symptoms after eating the wrong foods.  He gets up every few hours to go to the bathroom at night, but it sounds more like prostate related than PND or orthopnea. He is short of breath when he gets back to bed. He has no chest pain. His weight has not gone down although has not increased either. His compliance with his medications and medical followup is poor. He is on Proscar but thinks he had a problem with Flomax in the past; I urged him to check with Dr. Brower, and perhaps follow up with the urologist. After reviewing his labs his potassium was too high to consider spironolactone and we decided to try digoxin\par July 19, 2016. After 3 weeks of digoxin therapy he felt even worse with more weight gain, more shortness of breath and finally stopped it. Now he feels like he is back to himself. Again, he is taking the diuretic 4-5 times a week. No nauseousness, vomiting, diarrhea, et cetera.\par November 18, 2016. Patient is here in followup. For the most part he remained stable without any change in his symptoms. On the average taking the diuretic maybe 4 days a week according to wife; patient claims 6 days per week. He still has problems getting out if he takes a diuretic. Having urinary incontinence when he stands up from a sitting position. No increase in shortness of breath. No chest pain. No change in medications or interval hospital or emergency room visits. Still post-nasal drip.\par April 7, 2017. Patient is here in followup. Pretty stable from a cardiac point of view. He had an injection in his hip about 2 weeks ago and suddenly his knee pain was gone and he is feeling much better. Weight is about the same. No change in medication. Had labs and EKG with his yearly physical with Dr. Brower in March. A bladder stone was found incidentally and is being.\par September 14, 2017. Patient was hospitalized August 21 with difficulty urinating, and shortness of breath. No evidence of CHF at that time with clear Chest x-ray. Urinary retention responded to Ballesteros placement, but he was eventually found to have large stones in his bladder, that could not be passed and he is now scheduled for an upcoming procedure and a possible TURP. His cardiac status is significant, but stable. Left ventricular ejection fraction is diminished, but no CHF on his current regimen. He did not do well on digoxin and had refused Entresto. He also refused AICD. He has not smoked since his bypass in 2012. He had a mammary artery to his LAD. There was some disease in the right and circumflex, but non-bypassable. He is now on Lasix 80 mg daily in place of 40.\par May 25, 2018. First visit since above. Had check up with Dr. Brower in April (office closed) so did not want labs or ECG. Echo done with worse LV systolic function and more MR and TR. Long discussion. Refused Entresto in past.  Agreed to Invokanna.\par June 8, 2018. Patient returns in followup. Hard to exactly pin down but feels better and thinks breathing is better, even though his weight has not gone down. Taking I believe Innvokana 150 mg and on his own deciding between 40 and 80 of Lasix daily.\par March 5, 2019. First visit since June of last year. In the interim did have banding of his hemorrhoids. Responded very well to Invokana and has diuresed between 11 and 15 pounds. Often holds Lasix, and most of the time only takes 20 mg. Would like to lose more weight. No chest pain. No other complaints. Using CPAP for her sleep apnea and sleeps like a baby.\par August 26, 2019.  Patient returns in follow-up.  Had labs with Dr. Brower.  Remains in sinus rhythm at 71 with left bundle branch block.. Still refusing AICD/biventricular pacemaker.  Says he has more problems with chest tightness and fatigue in his legs than true shortness of breath.  Doing better with the CPAP.  Because of the Invokana he only takes the Lasix every 2 or 3 days sometimes even less and he has no edema.\par February 24, 2020.  Patient returns in follow-up along with his wife.  Ever since starting Invokana he has done great, so much so that he decided to try to stop the Lasix totally but he realized he was getting out of breath going up stairs and getting some edema so he went back to the Lasix.  He is nervous about taking it every day because of his kidney function.  Back in March of last year his creatinine had gone up to 1.9 but in August with his internist it was down to 1.6 again.  He has taken it for the last few days but usually takes it a few times a week only.  No other change in medication.  Still not interested in hearing about AICD or biventricular pacer.  Does have at least moderate MR almost 2 years ago on echo; perhaps would be a candidate for a clip.\par March 11, 2020.  Patient came for echocardiogram yesterday.  Echo virtually unchanged in terms of LV function and it was a very suboptimal study.  Only minimal MR detected.  Patient does seem to worsen he might need a transesophageal echo to rule out more significant MR.\par May 14th 2021.  First visit in over a year.  On the one hand fairly stable but he does admit to feeling very limited in what he can do.  He is much happier with the Invokana and was able to cut down on his diuretic a lot.  His blood pressure is excellent and his weight is down 5 pounds.  He had labs with his internist Dr. Brower on April 2.  Normal CBC.  Hemoglobin A1c 9.2.  BUN 28 with creatinine 1.87 and potassium 5.3.  Cholesterol 148, triglycerides 247, HDL 43, LDL 71.  Normal thyroid function.  EKG is unchanged.  After reviewing his medicine and long discussion with patient again now he seems to be willing to try an Entresto in place of his low-dose Cozaar.  We will start him on 49/51 and he will be seeing Dr. Brower in about 6 weeks and labs can be checked then.\par July 30, 2021.  Received labs from Dr. Brower dated July 23.  Normal CBC, normal TSH, hemoglobin A1c 9.1, cholesterol 205, triglycerides 293, HDL 50, .  Normal LFTs.  Kidney function with BUN 23 and creatinine 1.7 and potassium 5.5\par Overall no change in hemoglobin A1c, creatinine slightly better, LDL worse, HDL better. \par November 5, 2021.  Patient returns here with his wife for follow-up and echocardiogram.  For a while he was taking Entresto along with his losartan despite what I have in my notes and the fact that I had removed losartan from my medication list.  He does not remember me telling him to stop it but the pharmacy finally called him and he checked with Dr. Brower and he stopped it.  He does admit to feeling much better on the Entresto although he still gets some dyspnea on exertion.  He also received from the insurance that the other SGLT2 inhibitors would be cheaper for him then Invokana but he feels better on the Invokana and does not want to change.  I did tell him that I thought they were interchangeable if the cost is an issue.  His echocardiogram was very suboptimal but basically is unchanged, perhaps slightly improved but very hard to say for sure.  He had labs with Dr. Brower October 27 which had normal thyroid function, BUN of 26 with creatinine 1.87 and potassium of 5.2, hemoglobin A1c of 8.0.  His EKG remained sinus about 94 with a right bundle branch block and Q waves in V1

## 2021-11-12 NOTE — PATIENT PROFILE ADULT. - HEALTH/HEALTHCARE ANXIETIES, PROFILE
Gabapentin Pregnancy And Lactation Text: This medication is Pregnancy Category C and isn't considered safe during pregnancy. It is excreted in breast milk. none

## 2022-05-23 ENCOUNTER — APPOINTMENT (OUTPATIENT)
Dept: CARDIOLOGY | Facility: CLINIC | Age: 77
End: 2022-05-23

## 2022-05-23 ENCOUNTER — NON-APPOINTMENT (OUTPATIENT)
Age: 77
End: 2022-05-23

## 2022-12-14 NOTE — H&P PST ADULT - SOURCE OF INFORMATION, PROFILE
Operative Note      Patient: Sebastián Desir  YOB: 1964  MRN: [id number]                                                                                               Ezra Julien MD  FACC                                                                                         Dipl CBNC, CBCCT, NBE, RPVI                                                                                                    CARDIOVESION      Indication: Atrial fibrillation      After obtaining the informed consent pt was sedated  With     versed . A  200        J synchronised  shock was given. Pt converted to          Pt remained clinically and hemodynamically stable. INR before procedure was        Cont with present medical therapy.       I:  Successful cardioversion    Plan:  Cont present therapy  F/U in office in one week
patient

## 2023-09-19 NOTE — ED ADULT NURSE NOTE - CAS EDN DISCHARGE ASSESSMENT
Benzoyl Peroxide Pregnancy And Lactation Text: This medication is Pregnancy Category C. It is unknown if benzoyl peroxide is excreted in breast milk. Topical Sulfur Applications Counseling: Topical Sulfur Counseling: Patient counseled that this medication may cause skin irritation or allergic reactions.  In the event of skin irritation, the patient was advised to reduce the amount of the drug applied or use it less frequently.   The patient verbalized understanding of the proper use and possible adverse effects of topical sulfur application.  All of the patient's questions and concerns were addressed. Bactrim Counseling:  I discussed with the patient the risks of sulfa antibiotics including but not limited to GI upset, allergic reaction, drug rash, diarrhea, dizziness, photosensitivity, and yeast infections.  Rarely, more serious reactions can occur including but not limited to aplastic anemia, agranulocytosis, methemoglobinemia, blood dyscrasias, liver or kidney failure, lung infiltrates or desquamative/blistering drug rashes. Doxycycline Pregnancy And Lactation Text: This medication is Pregnancy Category D and not consider safe during pregnancy. It is also excreted in breast milk but is considered safe for shorter treatment courses. Azithromycin Counseling:  I discussed with the patient the risks of azithromycin including but not limited to GI upset, allergic reaction, drug rash, diarrhea, and yeast infections. Azelaic Acid Pregnancy And Lactation Text: This medication is considered safe during pregnancy and breast feeding. Spironolactone Counseling: Patient advised regarding risks of diarrhea, abdominal pain, hyperkalemia, birth defects (for female patients), liver toxicity and renal toxicity. The patient may need blood work to monitor liver and kidney function and potassium levels while on therapy. The patient verbalized understanding of the proper use and possible adverse effects of spironolactone.  All of the patient's questions and concerns were addressed. Sarecycline Counseling: Patient advised regarding possible photosensitivity and discoloration of the teeth, skin, lips, tongue and gums.  Patient instructed to avoid sunlight, if possible.  When exposed to sunlight, patients should wear protective clothing, sunglasses, and sunscreen.  The patient was instructed to call the office immediately if the following severe adverse effects occur:  hearing changes, easy bruising/bleeding, severe headache, or vision changes.  The patient verbalized understanding of the proper use and possible adverse effects of sarecycline.  All of the patient's questions and concerns were addressed. Minocycline Counseling: Patient advised regarding possible photosensitivity and discoloration of the teeth, skin, lips, tongue and gums.  Patient instructed to avoid sunlight, if possible.  When exposed to sunlight, patients should wear protective clothing, sunglasses, and sunscreen.  The patient was instructed to call the office immediately if the following severe adverse effects occur:  hearing changes, easy bruising/bleeding, severe headache, or vision changes.  The patient verbalized understanding of the proper use and possible adverse effects of minocycline.  All of the patient's questions and concerns were addressed. Topical Clindamycin Counseling: Patient counseled that this medication may cause skin irritation or allergic reactions.  In the event of skin irritation, the patient was advised to reduce the amount of the drug applied or use it less frequently.   The patient verbalized understanding of the proper use and possible adverse effects of clindamycin.  All of the patient's questions and concerns were addressed. Dapsone Pregnancy And Lactation Text: This medication is Pregnancy Category C and is not considered safe during pregnancy or breast feeding. High Dose Vitamin A Counseling: Side effects reviewed, pt to contact office should one occur. Tazorac Counseling:  Patient advised that medication is irritating and drying.  Patient may need to apply sparingly and wash off after an hour before eventually leaving it on overnight.  The patient verbalized understanding of the proper use and possible adverse effects of tazorac.  All of the patient's questions and concerns were addressed. Aklief Pregnancy And Lactation Text: It is unknown if this medication is safe to use during pregnancy.  It is unknown if this medication is excreted in breast milk.  Breastfeeding women should use the topical cream on the smallest area of the skin for the shortest time needed while breastfeeding.  Do not apply to nipple and areola. Birth Control Pills Pregnancy And Lactation Text: This medication should be avoided if pregnant and for the first 30 days post-partum. Winlevi Pregnancy And Lactation Text: This medication is considered safe during pregnancy and breastfeeding. Tetracycline Pregnancy And Lactation Text: This medication is Pregnancy Category D and not consider safe during pregnancy. It is also excreted in breast milk. Topical Retinoid counseling:  Patient advised to apply a pea-sized amount only at bedtime and wait 30 minutes after washing their face before applying.  If too drying, patient may add a non-comedogenic moisturizer. The patient verbalized understanding of the proper use and possible adverse effects of retinoids.  All of the patient's questions and concerns were addressed. Topical Sulfur Applications Pregnancy And Lactation Text: This medication is Pregnancy Category C and has an unknown safety profile during pregnancy. It is unknown if this topical medication is excreted in breast milk. Isotretinoin Counseling: Patient should get monthly blood tests, not donate blood, not drive at night if vision affected, not share medication, and not undergo elective surgery for 6 months after tx completed. Side effects reviewed, pt to contact office should one occur. Include Pregnancy/Lactation Warning?: No Bactrim Pregnancy And Lactation Text: This medication is Pregnancy Category D and is known to cause fetal risk.  It is also excreted in breast milk. Erythromycin Counseling:  I discussed with the patient the risks of erythromycin including but not limited to GI upset, allergic reaction, drug rash, diarrhea, increase in liver enzymes, and yeast infections. Spironolactone Pregnancy And Lactation Text: This medication can cause feminization of the male fetus and should be avoided during pregnancy. The active metabolite is also found in breast milk. Benzoyl Peroxide Counseling: Patient counseled that medicine may cause skin irritation and bleach clothing.  In the event of skin irritation, the patient was advised to reduce the amount of the drug applied or use it less frequently.   The patient verbalized understanding of the proper use and possible adverse effects of benzoyl peroxide.  All of the patient's questions and concerns were addressed. Topical Clindamycin Pregnancy And Lactation Text: This medication is Pregnancy Category B and is considered safe during pregnancy. It is unknown if it is excreted in breast milk. Doxycycline Counseling:  Patient counseled regarding possible photosensitivity and increased risk for sunburn.  Patient instructed to avoid sunlight, if possible.  When exposed to sunlight, patients should wear protective clothing, sunglasses, and sunscreen.  The patient was instructed to call the office immediately if the following severe adverse effects occur:  hearing changes, easy bruising/bleeding, severe headache, or vision changes.  The patient verbalized understanding of the proper use and possible adverse effects of doxycycline.  All of the patient's questions and concerns were addressed. High Dose Vitamin A Pregnancy And Lactation Text: High dose vitamin A therapy is contraindicated during pregnancy and breast feeding. Tazorac Pregnancy And Lactation Text: This medication is not safe during pregnancy. It is unknown if this medication is excreted in breast milk. Azelaic Acid Counseling: Patient counseled that medicine may cause skin irritation and to avoid applying near the eyes.  In the event of skin irritation, the patient was advised to reduce the amount of the drug applied or use it less frequently.   The patient verbalized understanding of the proper use and possible adverse effects of azelaic acid.  All of the patient's questions and concerns were addressed. Azithromycin Pregnancy And Lactation Text: This medication is considered safe during pregnancy and is also secreted in breast milk. Detail Level: Zone Dapsone Counseling: I discussed with the patient the risks of dapsone including but not limited to hemolytic anemia, agranulocytosis, rashes, methemoglobinemia, kidney failure, peripheral neuropathy, headaches, GI upset, and liver toxicity.  Patients who start dapsone require monitoring including baseline LFTs and weekly CBCs for the first month, then every month thereafter.  The patient verbalized understanding of the proper use and possible adverse effects of dapsone.  All of the patient's questions and concerns were addressed. Isotretinoin Pregnancy And Lactation Text: This medication is Pregnancy Category X and is considered extremely dangerous during pregnancy. It is unknown if it is excreted in breast milk. Aklief counseling:  Patient advised to apply a pea-sized amount only at bedtime and wait 30 minutes after washing their face before applying.  If too drying, patient may add a non-comedogenic moisturizer.  The most commonly reported side effects including irritation, redness, scaling, dryness, stinging, burning, itching, and increased risk of sunburn.  The patient verbalized understanding of the proper use and possible adverse effects of retinoids.  All of the patient's questions and concerns were addressed. Birth Control Pills Counseling: Birth Control Pill Counseling: I discussed with the patient the potential side effects of OCPs including but not limited to increased risk of stroke, heart attack, thrombophlebitis, deep venous thrombosis, hepatic adenomas, breast changes, GI upset, headaches, and depression.  The patient verbalized understanding of the proper use and possible adverse effects of OCPs. All of the patient's questions and concerns were addressed. Topical Retinoid Pregnancy And Lactation Text: This medication is Pregnancy Category C. It is unknown if this medication is excreted in breast milk. Winlevi Counseling:  I discussed with the patient the risks of topical clascoterone including but not limited to erythema, scaling, itching, and stinging. Patient voiced their understanding. Tetracycline Counseling: Patient counseled regarding possible photosensitivity and increased risk for sunburn.  Patient instructed to avoid sunlight, if possible.  When exposed to sunlight, patients should wear protective clothing, sunglasses, and sunscreen.  The patient was instructed to call the office immediately if the following severe adverse effects occur:  hearing changes, easy bruising/bleeding, severe headache, or vision changes.  The patient verbalized understanding of the proper use and possible adverse effects of tetracycline.  All of the patient's questions and concerns were addressed. Patient understands to avoid pregnancy while on therapy due to potential birth defects. Erythromycin Pregnancy And Lactation Text: This medication is Pregnancy Category B and is considered safe during pregnancy. It is also excreted in breast milk. Awake/Alert and oriented to person, place and time
